# Patient Record
Sex: FEMALE | Race: BLACK OR AFRICAN AMERICAN | NOT HISPANIC OR LATINO | Employment: FULL TIME | URBAN - METROPOLITAN AREA
[De-identification: names, ages, dates, MRNs, and addresses within clinical notes are randomized per-mention and may not be internally consistent; named-entity substitution may affect disease eponyms.]

---

## 2018-09-18 ENCOUNTER — MEDICAL CORRESPONDENCE (OUTPATIENT)
Dept: HEALTH INFORMATION MANAGEMENT | Facility: CLINIC | Age: 28
End: 2018-09-18

## 2018-10-15 ENCOUNTER — RADIANT APPOINTMENT (OUTPATIENT)
Dept: MAMMOGRAPHY | Facility: CLINIC | Age: 28
End: 2018-10-15
Attending: ADVANCED PRACTICE MIDWIFE
Payer: COMMERCIAL

## 2018-10-15 DIAGNOSIS — N64.4 PAIN OF BOTH BREASTS: ICD-10-CM

## 2018-10-15 RX ORDER — LIDOCAINE HYDROCHLORIDE 10 MG/ML
10 INJECTION, SOLUTION EPIDURAL; INFILTRATION; INTRACAUDAL; PERINEURAL ONCE
Status: COMPLETED | OUTPATIENT
Start: 2018-10-15 | End: 2018-10-15

## 2018-10-15 RX ORDER — LIDOCAINE HYDROCHLORIDE AND EPINEPHRINE 10; 10 MG/ML; UG/ML
10 INJECTION, SOLUTION INFILTRATION; PERINEURAL ONCE
Status: COMPLETED | OUTPATIENT
Start: 2018-10-15 | End: 2018-10-15

## 2018-10-15 RX ADMIN — LIDOCAINE HYDROCHLORIDE 10 ML: 10 INJECTION, SOLUTION EPIDURAL; INFILTRATION; INTRACAUDAL; PERINEURAL at 14:36

## 2018-10-15 RX ADMIN — LIDOCAINE HYDROCHLORIDE AND EPINEPHRINE 10 ML: 10; 10 INJECTION, SOLUTION INFILTRATION; PERINEURAL at 14:36

## 2018-10-18 LAB — COPATH REPORT: NORMAL

## 2018-10-19 ENCOUNTER — TELEPHONE (OUTPATIENT)
Dept: MAMMOGRAPHY | Facility: CLINIC | Age: 28
End: 2018-10-19

## 2018-10-19 NOTE — TELEPHONE ENCOUNTER
I spoke with Leonel and reported her benign results from her recent breast biopsy.  Clinical follow up is recommended. She verbalized understanding and had no questions or concerns.

## 2019-12-10 ENCOUNTER — APPOINTMENT (OUTPATIENT)
Dept: GENERAL RADIOLOGY | Facility: CLINIC | Age: 29
End: 2019-12-10
Attending: EMERGENCY MEDICINE
Payer: COMMERCIAL

## 2019-12-10 ENCOUNTER — HOSPITAL ENCOUNTER (EMERGENCY)
Facility: CLINIC | Age: 29
Discharge: HOME | End: 2019-12-10
Attending: EMERGENCY MEDICINE | Admitting: EMERGENCY MEDICINE
Payer: COMMERCIAL

## 2019-12-10 VITALS
TEMPERATURE: 98.1 F | RESPIRATION RATE: 16 BRPM | BODY MASS INDEX: 33.75 KG/M2 | WEIGHT: 199.7 LBS | SYSTOLIC BLOOD PRESSURE: 118 MMHG | HEART RATE: 79 BPM | DIASTOLIC BLOOD PRESSURE: 70 MMHG | OXYGEN SATURATION: 95 %

## 2019-12-10 DIAGNOSIS — R07.9 CHEST PAIN, UNSPECIFIED TYPE: ICD-10-CM

## 2019-12-10 LAB
ALBUMIN SERPL-MCNC: 3.5 G/DL (ref 3.4–5)
ALP SERPL-CCNC: 85 U/L (ref 40–150)
ALT SERPL W P-5'-P-CCNC: 62 U/L (ref 0–50)
ANION GAP SERPL CALCULATED.3IONS-SCNC: 6 MMOL/L (ref 3–14)
AST SERPL W P-5'-P-CCNC: 20 U/L (ref 0–45)
BASOPHILS # BLD AUTO: 0 10E9/L (ref 0–0.2)
BASOPHILS NFR BLD AUTO: 0.3 %
BILIRUB SERPL-MCNC: 0.3 MG/DL (ref 0.2–1.3)
BUN SERPL-MCNC: 8 MG/DL (ref 7–30)
CALCIUM SERPL-MCNC: 8.7 MG/DL (ref 8.5–10.1)
CHLORIDE SERPL-SCNC: 104 MMOL/L (ref 94–109)
CO2 SERPL-SCNC: 29 MMOL/L (ref 20–32)
CREAT SERPL-MCNC: 0.77 MG/DL (ref 0.52–1.04)
DIFFERENTIAL METHOD BLD: ABNORMAL
EOSINOPHIL # BLD AUTO: 0.2 10E9/L (ref 0–0.7)
EOSINOPHIL NFR BLD AUTO: 1.9 %
ERYTHROCYTE [DISTWIDTH] IN BLOOD BY AUTOMATED COUNT: 14.2 % (ref 10–15)
GFR SERPL CREATININE-BSD FRML MDRD: >90 ML/MIN/{1.73_M2}
GLUCOSE SERPL-MCNC: 81 MG/DL (ref 70–99)
HCT VFR BLD AUTO: 40.2 % (ref 35–47)
HGB BLD-MCNC: 12.5 G/DL (ref 11.7–15.7)
IMM GRANULOCYTES # BLD: 0 10E9/L (ref 0–0.4)
IMM GRANULOCYTES NFR BLD: 0.3 %
LYMPHOCYTES # BLD AUTO: 3.4 10E9/L (ref 0.8–5.3)
LYMPHOCYTES NFR BLD AUTO: 28.3 %
MCH RBC QN AUTO: 27.2 PG (ref 26.5–33)
MCHC RBC AUTO-ENTMCNC: 31.1 G/DL (ref 31.5–36.5)
MCV RBC AUTO: 87 FL (ref 78–100)
MONOCYTES # BLD AUTO: 0.9 10E9/L (ref 0–1.3)
MONOCYTES NFR BLD AUTO: 7.6 %
NEUTROPHILS # BLD AUTO: 7.3 10E9/L (ref 1.6–8.3)
NEUTROPHILS NFR BLD AUTO: 61.6 %
NRBC # BLD AUTO: 0 10*3/UL
NRBC BLD AUTO-RTO: 0 /100
PLATELET # BLD AUTO: 398 10E9/L (ref 150–450)
POTASSIUM SERPL-SCNC: 3.5 MMOL/L (ref 3.4–5.3)
PROT SERPL-MCNC: 7.7 G/DL (ref 6.8–8.8)
RBC # BLD AUTO: 4.6 10E12/L (ref 3.8–5.2)
SODIUM SERPL-SCNC: 139 MMOL/L (ref 133–144)
TROPONIN I SERPL-MCNC: <0.015 UG/L (ref 0–0.04)
WBC # BLD AUTO: 11.9 10E9/L (ref 4–11)

## 2019-12-10 PROCEDURE — 99285 EMERGENCY DEPT VISIT HI MDM: CPT | Mod: 25 | Performed by: EMERGENCY MEDICINE

## 2019-12-10 PROCEDURE — 71046 X-RAY EXAM CHEST 2 VIEWS: CPT

## 2019-12-10 PROCEDURE — 84484 ASSAY OF TROPONIN QUANT: CPT | Performed by: EMERGENCY MEDICINE

## 2019-12-10 PROCEDURE — 80053 COMPREHEN METABOLIC PANEL: CPT | Performed by: EMERGENCY MEDICINE

## 2019-12-10 PROCEDURE — 93005 ELECTROCARDIOGRAM TRACING: CPT | Performed by: EMERGENCY MEDICINE

## 2019-12-10 PROCEDURE — 93010 ELECTROCARDIOGRAM REPORT: CPT | Mod: Z6 | Performed by: EMERGENCY MEDICINE

## 2019-12-10 PROCEDURE — 85025 COMPLETE CBC W/AUTO DIFF WBC: CPT | Performed by: EMERGENCY MEDICINE

## 2019-12-10 ASSESSMENT — ENCOUNTER SYMPTOMS
LIGHT-HEADEDNESS: 0
DIFFICULTY URINATING: 0
HEADACHES: 0
NECK STIFFNESS: 0
SHORTNESS OF BREATH: 0
CONFUSION: 0
ARTHRALGIAS: 0
ABDOMINAL PAIN: 0
EYE REDNESS: 0
COLOR CHANGE: 0
FEVER: 0

## 2019-12-10 NOTE — ED AVS SNAPSHOT
[facility], [place], Emergency Department  [address]  [facility] [address]  Phone:  [phone]  Fax:  [phone]                                    Leonel Mena   MRN: [id number]    Department:  [facility], Emergency Department   Date of Visit:  12/10/2019           After Visit Summary Signature Page    I have received my discharge instructions, and my questions have been answered. I have discussed any challenges I see with this plan with the nurse or doctor.    ..........................................................................................................................................  Patient/Patient Representative Signature      ..........................................................................................................................................  Patient Representative Print Name and Relationship to Patient    ..................................................               ................................................  Date                                   Time    ..........................................................................................................................................  Reviewed by Signature/Title    ...................................................              ..............................................  Date                                               Time          22EPIC Rev 08/18

## 2019-12-11 LAB — INTERPRETATION ECG - MUSE: NORMAL

## 2019-12-11 NOTE — ED PROVIDER NOTES
"    [facility] EMERGENCY DEPARTMENT ([facility])     December 10, 2019    History     Chief Complaint   Patient presents with     Chest Pain     has been having CP \" more on the L side x 2 weeks on and off, feels lilke being punch in the chest.\" Pt had an appointment today with PMD, spoke with PMD on the phone and pt was told to go to the ED bec \"her EKG was not working\"     HPI  Leonel Mena is a 29 year old female with no significant past medical history who presents to the ED for evaluation of intermittent left-sided chest pain that started 2 weeks ago. Patient reports she has one to two episodes of chest pain a day, which last for a couple minutes. She states it feels like someone is \"punching [her] chest.\" She notes lifting her arms above her head or adjusting her position makes the pain better. Patient reports she has never had chest pain like this before, and went to her PCP at 7:00 PM tonight, but was sent here because the \"EKG machine was not working.\" She states her doctor did draw blood and noticed she had high cholesterol, so she was prescribed a statin. Patient denies recent falls. She denies light-headedness or shortness of breath. She denies recent long car/airplane travel. Patient notes she receives the Depo shot, and is due for this at the end of the month. No other symptoms noted.     PAST MEDICAL HISTORY  Past Medical History:   Diagnosis Date     NO ACTIVE PROBLEMS      PAST SURGICAL HISTORY  History reviewed. No pertinent surgical history.  FAMILY HISTORY  Family History   Problem Relation Age of Onset     Family History Negative Other      SOCIAL HISTORY  Social History     Tobacco Use     Smoking status: Never Smoker     Smokeless tobacco: Never Used   Substance Use Topics     Alcohol use: Yes     Comment: .occ     MEDICATIONS  No current facility-administered medications for this encounter.      Current Outpatient Medications   Medication     cyclobenzaprine (FLEXERIL) 10 MG tablet "     levonorgestrel-ethinyl estradiol (SEASONALE) 0.15-0.03 MG per tablet     metroNIDAZOLE (FLAGYL) 500 MG tablet     ALLERGIES  No Known Allergies      I have reviewed the Medications, Allergies, Past Medical and Surgical History, and Social History in the Epic system.    Review of Systems   Constitutional: Negative for fever.   HENT: Negative for congestion.    Eyes: Negative for redness.   Respiratory: Negative for shortness of breath.    Cardiovascular: Positive for chest pain.   Gastrointestinal: Negative for abdominal pain.   Genitourinary: Negative for difficulty urinating.   Musculoskeletal: Negative for arthralgias and neck stiffness.   Skin: Negative for color change.   Neurological: Negative for light-headedness and headaches.   Psychiatric/Behavioral: Negative for confusion.   All other systems reviewed and are negative.      Physical Exam   BP: 126/75  Pulse: 77  Temp: 98.1  F (36.7  C)  Resp: 16  Weight: 90.6 kg (199 lb 11.2 oz)  SpO2: 98 %      Physical Exam  Constitutional:       General: She is not in acute distress.     Appearance: She is well-developed. She is not diaphoretic.   HENT:      Head: Normocephalic and atraumatic.      Mouth/Throat:      Pharynx: No oropharyngeal exudate.   Eyes:      General: No scleral icterus.        Right eye: No discharge.         Left eye: No discharge.      Pupils: Pupils are equal, round, and reactive to light.   Neck:      Musculoskeletal: Normal range of motion and neck supple.   Cardiovascular:      Rate and Rhythm: Normal rate and regular rhythm.      Heart sounds: Normal heart sounds. No murmur. No friction rub. No gallop.    Pulmonary:      Effort: Pulmonary effort is normal. No respiratory distress.      Breath sounds: Normal breath sounds. No wheezing.   Chest:      Chest wall: No tenderness.   Abdominal:      General: Bowel sounds are normal. There is no distension.      Palpations: Abdomen is soft.      Tenderness: There is no abdominal tenderness.    Musculoskeletal: Normal range of motion.         General: No tenderness or deformity.   Skin:     General: Skin is warm and dry.      Coloration: Skin is not pale.      Findings: No erythema or rash.   Neurological:      Mental Status: She is alert and oriented to person, place, and time.      Cranial Nerves: No cranial nerve deficit.         ED Course        Procedures             EKG Interpretation:      Interpreted by Manjit Salvador DO  Time reviewed: 1745  Symptoms at time of EKG: none   Rhythm: normal sinus   Rate: 72  Axis: normal  Ectopy: none  Conduction: normal  ST Segments/ T Waves: No ST-T wave changes  Q Waves: none  Comparison to prior: No old EKG available    Clinical Impression: normal EKG          Critical Care time:  none             Labs Ordered and Resulted from Time of ED Arrival Up to the Time of Departure from the ED - No data to display         Assessments & Plan (with Medical Decision Making)   Is a 29-year-old female who presents with left-sided chest pain.  This is been intermittent in nature for the past 2 weeks. Differential is broad and includes but is not limited to ACS, PE, pneumonia, pneumothorax, aortic dissection, musculoskeletal pain, esophageal etiology, or other abnormalities. No known precipitating factors.  Exam demonstrated no acute abnormalities.  ECG was normal.  Chest x-ray shows no acute abnormalities lab work shows a WBC count of 11.9, no other abnormalities.  I discussed all results with patient.  Discussed that we do not know the exact cause of patient's symptoms but it is unlikely related to serious cause such as ACS, PE, pneumonia, or other etiology. Will discharge home with return precautions. Discussed reasons to return to the emergency department.  Patient understands and agrees with this plan.     I have reviewed the nursing notes.    I have reviewed the findings, diagnosis, plan and need for follow up with the patient.    New Prescriptions    No medications  on file       Final diagnoses:   None     ILexi, am serving as a trained medical scribe to document services personally performed by Manjit Salvador DO, based on the provider's statements to me.      IManjit DO, was physically present and have reviewed and verified the accuracy of this note documented by Lexi Cao.     12/10/2019   [facility], [place], EMERGENCY DEPARTMENT     Manjit Salvador DO  12/11/19 0106

## 2020-03-09 ENCOUNTER — HOSPITAL ENCOUNTER (EMERGENCY)
Facility: CLINIC | Age: 30
Discharge: HOME | End: 2020-03-09
Attending: EMERGENCY MEDICINE | Admitting: EMERGENCY MEDICINE
Payer: COMMERCIAL

## 2020-03-09 VITALS
SYSTOLIC BLOOD PRESSURE: 116 MMHG | DIASTOLIC BLOOD PRESSURE: 77 MMHG | TEMPERATURE: 98.4 F | HEART RATE: 80 BPM | BODY MASS INDEX: 34.28 KG/M2 | RESPIRATION RATE: 16 BRPM | OXYGEN SATURATION: 100 % | HEIGHT: 64 IN

## 2020-03-09 DIAGNOSIS — R19.7 VOMITING AND DIARRHEA: ICD-10-CM

## 2020-03-09 DIAGNOSIS — R11.10 VOMITING AND DIARRHEA: ICD-10-CM

## 2020-03-09 PROBLEM — E78.5 HYPERLIPIDEMIA: Status: ACTIVE | Noted: 2020-02-01

## 2020-03-09 LAB
ALBUMIN SERPL-MCNC: 3.2 G/DL (ref 3.4–5)
ALP SERPL-CCNC: 78 U/L (ref 40–150)
ALT SERPL W P-5'-P-CCNC: 110 U/L (ref 0–50)
ANION GAP SERPL CALCULATED.3IONS-SCNC: 7 MMOL/L (ref 3–14)
AST SERPL W P-5'-P-CCNC: 41 U/L (ref 0–45)
BASOPHILS # BLD AUTO: 0 10E9/L (ref 0–0.2)
BASOPHILS NFR BLD AUTO: 0.2 %
BILIRUB SERPL-MCNC: 0.4 MG/DL (ref 0.2–1.3)
BUN SERPL-MCNC: 7 MG/DL (ref 7–30)
CALCIUM SERPL-MCNC: 8.5 MG/DL (ref 8.5–10.1)
CHLORIDE SERPL-SCNC: 112 MMOL/L (ref 94–109)
CO2 SERPL-SCNC: 23 MMOL/L (ref 20–32)
CREAT SERPL-MCNC: 0.92 MG/DL (ref 0.52–1.04)
DIFFERENTIAL METHOD BLD: NORMAL
EOSINOPHIL # BLD AUTO: 0.1 10E9/L (ref 0–0.7)
EOSINOPHIL NFR BLD AUTO: 1.4 %
ERYTHROCYTE [DISTWIDTH] IN BLOOD BY AUTOMATED COUNT: 13.8 % (ref 10–15)
GFR SERPL CREATININE-BSD FRML MDRD: 83 ML/MIN/{1.73_M2}
GLUCOSE SERPL-MCNC: 84 MG/DL (ref 70–99)
HCT VFR BLD AUTO: 38.6 % (ref 35–47)
HGB BLD-MCNC: 12.3 G/DL (ref 11.7–15.7)
IMM GRANULOCYTES # BLD: 0 10E9/L (ref 0–0.4)
IMM GRANULOCYTES NFR BLD: 0.2 %
LYMPHOCYTES # BLD AUTO: 2.6 10E9/L (ref 0.8–5.3)
LYMPHOCYTES NFR BLD AUTO: 31.1 %
MCH RBC QN AUTO: 27.2 PG (ref 26.5–33)
MCHC RBC AUTO-ENTMCNC: 31.9 G/DL (ref 31.5–36.5)
MCV RBC AUTO: 85 FL (ref 78–100)
MONOCYTES # BLD AUTO: 0.6 10E9/L (ref 0–1.3)
MONOCYTES NFR BLD AUTO: 6.6 %
NEUTROPHILS # BLD AUTO: 5.1 10E9/L (ref 1.6–8.3)
NEUTROPHILS NFR BLD AUTO: 60.5 %
NRBC # BLD AUTO: 0 10*3/UL
NRBC BLD AUTO-RTO: 0 /100
PLATELET # BLD AUTO: 315 10E9/L (ref 150–450)
POTASSIUM SERPL-SCNC: 3.8 MMOL/L (ref 3.4–5.3)
PROT SERPL-MCNC: 6.9 G/DL (ref 6.8–8.8)
RBC # BLD AUTO: 4.53 10E12/L (ref 3.8–5.2)
SODIUM SERPL-SCNC: 142 MMOL/L (ref 133–144)
WBC # BLD AUTO: 8.4 10E9/L (ref 4–11)

## 2020-03-09 PROCEDURE — 85025 COMPLETE CBC W/AUTO DIFF WBC: CPT | Performed by: EMERGENCY MEDICINE

## 2020-03-09 PROCEDURE — 80053 COMPREHEN METABOLIC PANEL: CPT | Performed by: EMERGENCY MEDICINE

## 2020-03-09 PROCEDURE — 25800030 ZZH RX IP 258 OP 636: Performed by: EMERGENCY MEDICINE

## 2020-03-09 PROCEDURE — 99284 EMERGENCY DEPT VISIT MOD MDM: CPT | Mod: Z6 | Performed by: EMERGENCY MEDICINE

## 2020-03-09 PROCEDURE — 99284 EMERGENCY DEPT VISIT MOD MDM: CPT | Mod: 25 | Performed by: EMERGENCY MEDICINE

## 2020-03-09 PROCEDURE — 96360 HYDRATION IV INFUSION INIT: CPT | Performed by: EMERGENCY MEDICINE

## 2020-03-09 RX ORDER — MEDROXYPROGESTERONE ACETATE 150 MG/ML
150 INJECTION, SUSPENSION INTRAMUSCULAR
COMMUNITY

## 2020-03-09 RX ORDER — ONDANSETRON 2 MG/ML
4 INJECTION INTRAMUSCULAR; INTRAVENOUS
Status: DISCONTINUED | OUTPATIENT
Start: 2020-03-09 | End: 2020-03-09 | Stop reason: HOSPADM

## 2020-03-09 RX ADMIN — SODIUM CHLORIDE 1000 ML: 9 INJECTION, SOLUTION INTRAVENOUS at 08:04

## 2020-03-09 ASSESSMENT — ENCOUNTER SYMPTOMS
VOMITING: 1
NAUSEA: 1
MYALGIAS: 1
DIARRHEA: 1
FEVER: 1
ABDOMINAL PAIN: 0

## 2020-03-09 NOTE — ED AVS SNAPSHOT
[facility], [place], Emergency Department  [address]  [facility] [address]  Phone:  [phone]  Fax:  [phone]                                    Leonel Mena   MRN: [id number]    Department:  [facility], Emergency Department   Date of Visit:  3/9/2020           After Visit Summary Signature Page    I have received my discharge instructions, and my questions have been answered. I have discussed any challenges I see with this plan with the nurse or doctor.    ..........................................................................................................................................  Patient/Patient Representative Signature      ..........................................................................................................................................  Patient Representative Print Name and Relationship to Patient    ..................................................               ................................................  Date                                   Time    ..........................................................................................................................................  Reviewed by Signature/Title    ...................................................              ..............................................  Date                                               Time          22EPIC Rev 08/18

## 2020-03-09 NOTE — DISCHARGE INSTRUCTIONS
Advance diet as tolerated    Please make an appointment to follow up with Your Primary Care Provider or our Primary Care Center (phone: [phone]) in 2-3 days if not improving.

## 2020-03-09 NOTE — ED PROVIDER NOTES
[facility] EMERGENCY DEPARTMENT ([facility])     March 9, 2020    History     Chief Complaint   Patient presents with     Nausea, Vomiting, & Diarrhea     since Thursday, body aches     HPI  Leonel Mena is a 30 year old female who states that on Thursday evening she went out to eat at a restaurant with another person and then on Friday had cavities filled at her dentist.  Patient states that Friday evening she started to feel ill with some mild body aches, fevers and this was associated with nausea and vomiting and diarrhea.  Patient states that that lasted approximately 24 hours and she started to feel better yesterday, but called her primary care who told her to come into the ER to be seen.  Patient denies any abdominal pain and states her vomiting and diarrhea has pretty much resolved, but she still has a few body aches left.    This part of the medical record was transcribed by Lexi Cao  Medical Scribe, from a dictation done by Meliton Wagner MD.     PAST MEDICAL HISTORY  Past Medical History:   Diagnosis Date     Hyperlipidemia 02/01/2020     NO ACTIVE PROBLEMS      PAST SURGICAL HISTORY  History reviewed. No pertinent surgical history.  FAMILY HISTORY  Family History   Problem Relation Age of Onset     Family History Negative Other      SOCIAL HISTORY  Social History     Tobacco Use     Smoking status: Never Smoker     Smokeless tobacco: Never Used   Substance Use Topics     Alcohol use: Yes     Comment: .occ     MEDICATIONS  Current Facility-Administered Medications   Medication     ondansetron (ZOFRAN) injection 4 mg     Current Outpatient Medications   Medication     medroxyPROGESTERone (DEPO-PROVERA) 150 MG/ML IM injection     ALLERGIES  Allergies   Allergen Reactions     Grass Hives       I have reviewed the Medications, Allergies, Past Medical and Surgical History, and Social History in the Epic system.    Review of Systems   Constitutional: Positive for fever.   Gastrointestinal:  "Positive for diarrhea (mostly resolved now), nausea and vomiting (mostly resolved now). Negative for abdominal pain.   Musculoskeletal: Positive for myalgias.   All other systems reviewed and are negative.      Physical Exam   BP: 119/80  Pulse: 80  Heart Rate: 80  Temp: 98.4  F (36.9  C)  Resp: 16  Height: 162.6 cm (5' 4\")  SpO2: 99 %      Physical Exam  Vitals signs and nursing note reviewed.   Constitutional:       Appearance: She is not ill-appearing.   HENT:      Head: Atraumatic.   Eyes:      Extraocular Movements: Extraocular movements intact.      Pupils: Pupils are equal, round, and reactive to light.   Neck:      Musculoskeletal: Neck supple.   Cardiovascular:      Rate and Rhythm: Regular rhythm.      Heart sounds: Normal heart sounds.   Pulmonary:      Breath sounds: Normal breath sounds.   Abdominal:      Palpations: Abdomen is soft.      Tenderness: There is no abdominal tenderness.   Musculoskeletal:         General: No deformity.   Skin:     General: Skin is warm.   Neurological:      General: No focal deficit present.      Mental Status: She is alert and oriented to person, place, and time.   Psychiatric:         Mood and Affect: Mood normal.         ED Course        Procedures        Results for orders placed or performed during the hospital encounter of 03/09/20 (from the past 24 hour(s))   CBC with platelets differential   Result Value Ref Range    WBC 8.4 4.0 - 11.0 10e9/L    RBC Count 4.53 3.8 - 5.2 10e12/L    Hemoglobin 12.3 11.7 - 15.7 g/dL    Hematocrit 38.6 35.0 - 47.0 %    MCV 85 78 - 100 fl    MCH 27.2 26.5 - 33.0 pg    MCHC 31.9 31.5 - 36.5 g/dL    RDW 13.8 10.0 - 15.0 %    Platelet Count 315 150 - 450 10e9/L    Diff Method Automated Method     % Neutrophils 60.5 %    % Lymphocytes 31.1 %    % Monocytes 6.6 %    % Eosinophils 1.4 %    % Basophils 0.2 %    % Immature Granulocytes 0.2 %    Nucleated RBCs 0 0 /100    Absolute Neutrophil 5.1 1.6 - 8.3 10e9/L    Absolute Lymphocytes 2.6 0.8 - " 5.3 10e9/L    Absolute Monocytes 0.6 0.0 - 1.3 10e9/L    Absolute Eosinophils 0.1 0.0 - 0.7 10e9/L    Absolute Basophils 0.0 0.0 - 0.2 10e9/L    Abs Immature Granulocytes 0.0 0 - 0.4 10e9/L    Absolute Nucleated RBC 0.0    Comprehensive metabolic panel   Result Value Ref Range    Sodium 142 133 - 144 mmol/L    Potassium 3.8 3.4 - 5.3 mmol/L    Chloride 112 (H) 94 - 109 mmol/L    Carbon Dioxide 23 20 - 32 mmol/L    Anion Gap 7 3 - 14 mmol/L    Glucose 84 70 - 99 mg/dL    Urea Nitrogen 7 7 - 30 mg/dL    Creatinine 0.92 0.52 - 1.04 mg/dL    GFR Estimate 83 >60 mL/min/[1.73_m2]    GFR Estimate If Black >90 >60 mL/min/[1.73_m2]    Calcium 8.5 8.5 - 10.1 mg/dL    Bilirubin Total 0.4 0.2 - 1.3 mg/dL    Albumin 3.2 (L) 3.4 - 5.0 g/dL    Protein Total 6.9 6.8 - 8.8 g/dL    Alkaline Phosphatase 78 40 - 150 U/L     (H) 0 - 50 U/L    AST 41 0 - 45 U/L         Labs Ordered and Resulted from Time of ED Arrival Up to the Time of Departure from the ED   COMPREHENSIVE METABOLIC PANEL - Abnormal; Notable for the following components:       Result Value    Chloride 112 (*)     Albumin 3.2 (*)      (*)     All other components within normal limits   CBC WITH PLATELETS DIFFERENTIAL   PERIPHERAL IV CATHETER            Assessments & Plan (with Medical Decision Making)     I have reviewed the nursing notes.    Medications   ondansetron (ZOFRAN) injection 4 mg (4 mg Intravenous Not Given 3/9/20 0805)   0.9% sodium chloride BOLUS (0 mLs Intravenous Stopped 3/9/20 0920)     Patient improved after IV fluids and meds.  At this time the patient is taking orally well and will be sent home.  Etiology of her vomiting and diarrhea is uncertain as to whether it was a virus or whether it was food poisoning.  Clinically the patient is much better at this point.    I have reviewed the findings, diagnosis, plan and need for follow up with the patient.    New Prescriptions    No medications on file       Final diagnoses:   Vomiting and  diarrhea     Advance diet as tolerated    Please make an appointment to follow up with Your Primary Care Provider or our Primary Care Center (phone: [phone]) in 2-3 days if not improving.    Meliton Wagner MD, MD    3/9/2020   [facility], [place], EMERGENCY DEPARTMENT     Meliton Wagner MD  03/09/20 0939

## 2020-03-17 ENCOUNTER — TELEPHONE (OUTPATIENT)
Dept: FAMILY MEDICINE | Facility: CLINIC | Age: 30
End: 2020-03-17

## 2020-09-02 ENCOUNTER — OFFICE VISIT - HEALTHEAST (OUTPATIENT)
Dept: OTOLARYNGOLOGY | Facility: CLINIC | Age: 30
End: 2020-09-02

## 2020-09-02 DIAGNOSIS — R22.1 SUBCUTANEOUS NODULE OF NECK: ICD-10-CM

## 2020-09-02 NOTE — TELEPHONE ENCOUNTER
RECORDS RECEIVED FROM: Internal/Care Everywhere   DATE RECEIVED: 9-23   NOTES STATUS DETAILS   OFFICE NOTE from referring provider    Care Everywhere 3-17-20 and 6-9-20 both list it Dr. Navarro   OFFICE NOTE from other cardiologist    N/A    DISCHARGE SUMMARY from hospital    N/A    DISCHARGE REPORT from the ER   Internal 12-10-19   OPERATIVE REPORT    N/A    MEDICATION LIST   Internal    LABS     BMP   N/A    CBC   Care Everywhere 6-9-20   CMP   Care Everywhere 6-9-20   Lipids   Care Everywhere 6-9-20   TSH   Care Everywhere 9-18-18   DIAGNOSTIC PROCEDURES     EKG   Internal 12-10-19   Monitor Reports   N/A    IMAGING (DISC & REPORT)      Echo   N/A    Stress Tests   N/A    Cath   N/A    MRI/MRA   N/A    CT/CTA   N/A

## 2020-09-23 ENCOUNTER — PRE VISIT (OUTPATIENT)
Dept: CARDIOLOGY | Facility: CLINIC | Age: 30
End: 2020-09-23

## 2020-09-23 ENCOUNTER — OFFICE VISIT (OUTPATIENT)
Dept: CARDIOLOGY | Facility: CLINIC | Age: 30
End: 2020-09-23
Attending: INTERNAL MEDICINE
Payer: COMMERCIAL

## 2020-09-23 VITALS
DIASTOLIC BLOOD PRESSURE: 71 MMHG | HEIGHT: 65 IN | BODY MASS INDEX: 34.49 KG/M2 | SYSTOLIC BLOOD PRESSURE: 104 MMHG | HEART RATE: 90 BPM | OXYGEN SATURATION: 97 % | WEIGHT: 207 LBS

## 2020-09-23 DIAGNOSIS — E78.00 HIGH BLOOD CHOLESTEROL: ICD-10-CM

## 2020-09-23 DIAGNOSIS — E78.00 HIGH BLOOD CHOLESTEROL: Primary | ICD-10-CM

## 2020-09-23 LAB
CHOLEST SERPL-MCNC: 385 MG/DL
CK SERPL-CCNC: 184 U/L (ref 30–225)
HDLC SERPL-MCNC: 43 MG/DL
LDLC SERPL CALC-MCNC: 322 MG/DL
NONHDLC SERPL-MCNC: 343 MG/DL
TRIGL SERPL-MCNC: 103 MG/DL

## 2020-09-23 PROCEDURE — 36415 COLL VENOUS BLD VENIPUNCTURE: CPT | Performed by: INTERNAL MEDICINE

## 2020-09-23 PROCEDURE — 82550 ASSAY OF CK (CPK): CPT | Performed by: INTERNAL MEDICINE

## 2020-09-23 PROCEDURE — G0463 HOSPITAL OUTPT CLINIC VISIT: HCPCS | Mod: ZF

## 2020-09-23 PROCEDURE — 99203 OFFICE O/P NEW LOW 30 MIN: CPT | Mod: ZP | Performed by: INTERNAL MEDICINE

## 2020-09-23 PROCEDURE — 80061 LIPID PANEL: CPT | Performed by: INTERNAL MEDICINE

## 2020-09-23 RX ORDER — ROSUVASTATIN CALCIUM 20 MG/1
20 TABLET, COATED ORAL DAILY
Qty: 90 TABLET | Refills: 3 | Status: SHIPPED | OUTPATIENT
Start: 2020-09-23

## 2020-09-23 ASSESSMENT — PAIN SCALES - GENERAL: PAINLEVEL: NO PAIN (0)

## 2020-09-23 ASSESSMENT — MIFFLIN-ST. JEOR: SCORE: 1641.89

## 2020-09-23 NOTE — PATIENT INSTRUCTIONS
Patient Instructions:  Labs today (lipid panel and CK total).  Start taking rosuvastatin.  In 3 months repeat lab (lipid panel) - must fast for 12 hours prior.   Please call to schedule that appointment : [phone]  As soon as results are compiled and reviewed, you will be notified.      It was a pleasure to see you in the cardiology clinic today.    We are encouraging the use of Optifyt to communicate with your Healthcare Provider.  If you have any questions, call  Wilver Melchor LPN, at [phone].  Press Option #1 for the [facility], and then press Option #4 for nursing.  Cardiology Fax  : [phone]      If you have an urgent need after hours (8:00 am to 4:30 pm) please call [phone] and ask for the cardiology fellow on call.

## 2020-09-23 NOTE — NURSING NOTE
Chief Complaint   Patient presents with     New Patient     new - referral from another health network     Vitals were taken and medications were reconciled.  Sigrid Ffoana  7:08 AM

## 2020-09-23 NOTE — LETTER
"9/23/2020      RE: Leonel Mena  [address] [id number]  [facility] [address]       Dear Colleague,    Thank you for the opportunity to participate in the care of your patient, Leonel Mena, at the [facility]. Please see a copy of my visit note below.    Reason for Visit:  Today I have visited with Leonel Mena for hyperlipidemia  Consult: Yes    HPI : Status / Symptoms / Concerns     30-year-old asymptomatic female with hyperlipidemia. Patient reports hx of high cholesterol for years. Recently tried on atorvastatin 20 mg but DC'd 2/2 muscle aches. Reports family hx of CVA and MI in grandmother on her mothers side. No smoking hx, no HTN, no DM hx. She has recently joined a gym and is exercising regularly.    Chest Pain:   No  Shortness of Breath:  No  Ankle Swelling:  No  Muscle Aches:  Yes, now improved  Palpitations:   No        Past Medical History:   Diagnosis Date     Hyperlipidemia 02/01/2020     NO ACTIVE PROBLEMS       No past surgical history on file.       Other Systems:  Resp - / GI - /MS - /Lacho - /Psy - /Derm - /Hem - / - /Lymph - /ENT -/ Endo -  No other pertinent concern in systems review.     Social History: reports that she has never smoked. She has never used smokeless tobacco. She reports current alcohol use. She reports that she does not use drugs.   I have reviewed this patient's social history and updated it with pertinent information if needed.    Family History:She indicated that the status of her other is unknown.     Family History   Problem Relation Age of Onset     Family History Negative Other        Medications:  Current Outpatient Medications   Medication     medroxyPROGESTERone (DEPO-PROVERA) 150 MG/ML IM injection     No current facility-administered medications for this visit.           Exam:  /71 (BP Location: Right arm, Patient Position: Chair, Cuff Size: Adult Regular)   Pulse 90   Ht 1.638 m (5' 4.5\") "   Wt 93.9 kg (207 lb)   SpO2 97%   BMI 34.98 kg/m     Body mass index is 34.98 kg/m .   General:  Alert, oriented, no acute distress, normal chair rise, walking not impaired   Eyes:  External exam normal, Conjunctivae noninjected and nonicteric.  Neck:  No JVD  Lungs:  Clear to auscultation bilaterally. No wheezes, crackles, rales or rhonchi,      no accessory muscle use   Heart:  Regular, normal S1 and S2, no obvious murmurs, no rubs or gallops  Lacho/Psy: Non-focal, normal mood, normal affect        Vital Trend:  Wt Readings from Last 3 Encounters:   12/10/19 90.6 kg (199 lb 11.2 oz)   12/16/15 83.7 kg (184 lb 8 oz)   01/15/15 76.9 kg (169 lb 8 oz)     BP Readings from Last 3 Encounters:   03/09/20 116/77   12/10/19 118/70   12/16/15 116/69     Pulse Readings from Last 3 Encounters:   03/09/20 80   12/10/19 79   12/16/15 93          Data:     Lab Review:  Lab Results   Component Value Date    CR 0.92 03/09/2020    CR 0.77 12/10/2019    POTASSIUM 3.8 03/09/2020    POTASSIUM 3.5 12/10/2019     03/09/2020     12/10/2019     CHOLESTEROL, TOTAL  <200 mg/dL  298High       HDL CHOLESTEROL  > OR = 50 mg/dL  38Low       TRIGLYCERIDES  <150 mg/dL  93     LDL-CHOLESTEROL  99 mg/dL (calc)  239High            Assessment:     Leonel Mena is a 30 year old female with asymptomatic hyperlipidemia. Overall she has risk factors including obesity and family hx. Discussed the primary goal will be lowering of LDL and total cholesterol. Plan will be for her to continue exercise and diet regimen which she has already begun. Will start Crestor 20 mg daily.       Plan:   1. Crestor 20 mg daily, will plan on phone call in 3 months and repeat lipid panel at that time.  2. Counseled on muscle aches, hydration and monitoring of symptoms while on crestor   3. Agree with establishing care with PCP and continuing exercise plan      Contingency Plan: Reducing or skiping doses, considering other lipid lowering agents if she has  difficulty with symptoms     Follow-up: Will call patient in 3 months following lipids    This note was software transcribed.       Please do not hesitate to contact me if you have any questions/concerns.     Sincerely,     Chan Noel MD

## 2020-09-23 NOTE — PROGRESS NOTES
"Reason for Visit:  Today I have visited with Leonel Trina for hyperlipidemia  Consult: Yes    HPI : Status / Symptoms / Concerns     30-year-old asymptomatic female with hyperlipidemia. Patient reports hx of high cholesterol for years. Recently tried on atorvastatin 20 mg but DC'd 2/2 muscle aches. Reports family hx of CVA and MI in grandmother on her mothers side. No smoking hx, no HTN, no DM hx. She has recently joined a gym and is exercising regularly.    Chest Pain:   No  Shortness of Breath:  No  Ankle Swelling:  No  Muscle Aches:  Yes, now improved  Palpitations:   No        Past Medical History:   Diagnosis Date     Hyperlipidemia 02/01/2020     NO ACTIVE PROBLEMS       No past surgical history on file.       Other Systems:  Resp - / GI - /MS - /Lacho - /Psy - /Derm - /Hem - / - /Lymph - /ENT -/ Endo -  No other pertinent concern in systems review.     Social History: reports that she has never smoked. She has never used smokeless tobacco. She reports current alcohol use. She reports that she does not use drugs.   I have reviewed this patient's social history and updated it with pertinent information if needed.    Family History:She indicated that the status of her other is unknown.     Family History   Problem Relation Age of Onset     Family History Negative Other        Medications:  Current Outpatient Medications   Medication     medroxyPROGESTERone (DEPO-PROVERA) 150 MG/ML IM injection     No current facility-administered medications for this visit.           Exam:  /71 (BP Location: Right arm, Patient Position: Chair, Cuff Size: Adult Regular)   Pulse 90   Ht 1.638 m (5' 4.5\")   Wt 93.9 kg (207 lb)   SpO2 97%   BMI 34.98 kg/m     Body mass index is 34.98 kg/m .   General:  Alert, oriented, no acute distress, normal chair rise, walking not impaired   Eyes:  External exam normal, Conjunctivae noninjected and nonicteric.  Neck:  No JVD  Lungs:  Clear to auscultation bilaterally. No wheezes, " crackles, rales or rhonchi,      no accessory muscle use   Heart:  Regular, normal S1 and S2, no obvious murmurs, no rubs or gallops  Lacho/Psy: Non-focal, normal mood, normal affect        Vital Trend:  Wt Readings from Last 3 Encounters:   12/10/19 90.6 kg (199 lb 11.2 oz)   12/16/15 83.7 kg (184 lb 8 oz)   01/15/15 76.9 kg (169 lb 8 oz)     BP Readings from Last 3 Encounters:   03/09/20 116/77   12/10/19 118/70   12/16/15 116/69     Pulse Readings from Last 3 Encounters:   03/09/20 80   12/10/19 79   12/16/15 93          Data:     Lab Review:  Lab Results   Component Value Date    CR 0.92 03/09/2020    CR 0.77 12/10/2019    POTASSIUM 3.8 03/09/2020    POTASSIUM 3.5 12/10/2019     03/09/2020     12/10/2019     CHOLESTEROL, TOTAL  <200 mg/dL  298High       HDL CHOLESTEROL  > OR = 50 mg/dL  38Low       TRIGLYCERIDES  <150 mg/dL  93     LDL-CHOLESTEROL  99 mg/dL (calc)  239High            Assessment:     Leonel Mena is a 30 year old female with asymptomatic hyperlipidemia. Overall she has risk factors including obesity and family hx. Discussed the primary goal will be lowering of LDL and total cholesterol. Plan will be for her to continue exercise and diet regimen which she has already begun. Will start Crestor 20 mg daily.       Plan:   1. Crestor 20 mg daily, will plan on phone call in 3 months and repeat lipid panel at that time.  2. Counseled on muscle aches, hydration and monitoring of symptoms while on crestor   3. Agree with establishing care with PCP and continuing exercise plan      Contingency Plan: Reducing or skiping doses, considering other lipid lowering agents if she has difficulty with symptoms     Follow-up: Will call patient in 3 months following lipids    This note was software transcribed.

## 2020-10-02 ENCOUNTER — HOSPITAL ENCOUNTER (OUTPATIENT)
Dept: ULTRASOUND IMAGING | Facility: HOSPITAL | Age: 30
Discharge: HOME | End: 2020-10-02
Attending: OTOLARYNGOLOGY

## 2020-10-02 DIAGNOSIS — R22.1 SUBCUTANEOUS NODULE OF NECK: ICD-10-CM

## 2020-10-14 ENCOUNTER — COMMUNICATION - HEALTHEAST (OUTPATIENT)
Dept: OTOLARYNGOLOGY | Facility: CLINIC | Age: 30
End: 2020-10-14

## 2021-06-11 NOTE — PROGRESS NOTES
HISTORY OF PRESENT ILLNESS  Patient comes in for evaluation of swallowing concerns. Patient reports that when she swallows she feels a lump underneath her jaw. She notices a lump in her upper neck/submentum that lowers when she swallows. She had not noticed this before. No problems swallowing. She handles all types of food and liquid normally. No change in voice. No pain or discomfort. She notices the lump with normal swallowing and dry swallowing.     REVIEW OF SYSTEMS  Review of Systems: a 10-system review was performed. Pertinent positives are noted in the HPI and on a separate scanned document in the chart.    PMH, PSH, FH and SH has documented in the EHR.      EXAM    CONSTITUTIONAL  General Appearance:  Normal, well developed, well nourished, no obvious distress  Ability to Communicate:  communicates appropriately.    HEAD AND FACE  Appearance and Symmetry:  Normal, no scalp or facial scarring or suspicious lesions.  Paranasal sinuses tenderness:  Normal, Paranasal sinuses non tender    EARS  Clinical speech reception threshold:  Normal, able to hear normal speech.  Auricle:  Normal, Auricles without scars, lesions, masses.  External auditory canal:  Normal, External auditory canal normal.  Tympanic membrane:  Normal, Tympanic membranes normal without swelling or erythema.    NOSE (speculum or scope)  Architecture:  Normal, Grossly normal external nasal architecture with no masses or lesions.  Mucosa:  Normal mucosa, No polyps or masses.  Septum:  Normal, Septum non-obstructing.  Turbinates:  Normal, No turbinate abnormalities    ORAL CAVITY AND OROPHARYNX  Lips:  Normal.  Dental and gingiva:  Normal, No obvious dental or gingival disease.  Mucosa:  Normal, Moist mucous membranes.  Tongue:  Normal, Tongue mobile with no mucosal abnormalities  Hard and soft palate:  Normal, Hard and soft palate without cleft or mucosal lesions.  Oral pharynx:  Normal, Posterior pharynx without lesions or remarkable  asymmetry.  Saliva:  Normal, Clear saliva.  Masses:  Normal, No palpable masses or pathologically enlarged lymph nodes.    NECK  Palpable 1cm  nodule in the submentum   Masses/lymph nodes:  Normal, No worrisome neck masses or lymph nodes.  Salivary glands:  Normal, Parotid and submandibular glands.  Trachea and larynx position:  Normal, Trachea and larynx midline.  Thyroid:  Normal, No thyroid abnormality.  Tenderness:  Normal, No cervical tenderness.  Suppleness:  Normal, Neck supple    NEUROLOGICAL  Speech pattern:  Normal, Proasaic    RESPIRATORY  Symmetry and Respiratory effort:  Normal, Symmetric chest movement and expansion with no increased intercostal retractions or use of accessory muscles.     IMPRESSION  Palpable nodule in the submentum    RECOMMENDATION  Ultrasound of the area to determine if lymph node vs other lesion.    Andrew Mensah MD

## 2021-06-12 NOTE — TELEPHONE ENCOUNTER
----- Message from Andrew VELA MD sent at 10/12/2020  4:28 PM CDT -----  You can let the patient know that the ultrasound showed a normal lymph node. Nothing further needs to be done at this time. The radiologist was clear that the size, shape and appearance are consistent with a normal lymph node. Follow up if questions.  ----- Message -----  From: Interface, Rad Results In  Sent: 10/2/2020   7:14 AM CDT  To: Andrew VELA MD

## 2021-06-12 NOTE — TELEPHONE ENCOUNTER
Left message for patient to call back.    Clarissa Ernst RN  [facility] [place]  [phone]

## 2021-06-12 NOTE — TELEPHONE ENCOUNTER
Patient notified of normal US results. Told her to follow up if she has an new symptoms or issues. She expressed understanding.    Clarisas Ernst RN  [facility]  [phone]

## 2021-07-30 ENCOUNTER — HOSPITAL ENCOUNTER (EMERGENCY)
Facility: CLINIC | Age: 31
End: 2021-07-30
Payer: COMMERCIAL

## 2022-04-29 ENCOUNTER — APPOINTMENT (OUTPATIENT)
Dept: GENERAL RADIOLOGY | Facility: CLINIC | Age: 32
End: 2022-04-29
Attending: EMERGENCY MEDICINE
Payer: COMMERCIAL

## 2022-04-29 ENCOUNTER — HOSPITAL ENCOUNTER (EMERGENCY)
Facility: CLINIC | Age: 32
Discharge: HOME | End: 2022-04-29
Attending: EMERGENCY MEDICINE | Admitting: EMERGENCY MEDICINE
Payer: COMMERCIAL

## 2022-04-29 VITALS
DIASTOLIC BLOOD PRESSURE: 69 MMHG | OXYGEN SATURATION: 100 % | HEART RATE: 83 BPM | TEMPERATURE: 98.7 F | WEIGHT: 218.7 LBS | BODY MASS INDEX: 36.96 KG/M2 | RESPIRATION RATE: 16 BRPM | SYSTOLIC BLOOD PRESSURE: 109 MMHG

## 2022-04-29 DIAGNOSIS — Z20.822 COVID-19 RULED OUT BY LABORATORY TESTING: ICD-10-CM

## 2022-04-29 DIAGNOSIS — R07.9 CHEST PAIN, UNSPECIFIED TYPE: ICD-10-CM

## 2022-04-29 LAB
ALBUMIN SERPL-MCNC: 3.4 G/DL (ref 3.4–5)
ALBUMIN UR-MCNC: 10 MG/DL
ALP SERPL-CCNC: 86 U/L (ref 40–150)
ALT SERPL W P-5'-P-CCNC: 23 U/L (ref 0–50)
ANION GAP SERPL CALCULATED.3IONS-SCNC: 4 MMOL/L (ref 3–14)
APPEARANCE UR: ABNORMAL
AST SERPL W P-5'-P-CCNC: 15 U/L (ref 0–45)
BACTERIA #/AREA URNS HPF: ABNORMAL /HPF
BASOPHILS # BLD AUTO: 0 10E3/UL (ref 0–0.2)
BASOPHILS NFR BLD AUTO: 0 %
BILIRUB SERPL-MCNC: 0.3 MG/DL (ref 0.2–1.3)
BILIRUB UR QL STRIP: NEGATIVE
BUN SERPL-MCNC: 8 MG/DL (ref 7–30)
CALCIUM SERPL-MCNC: 9.1 MG/DL (ref 8.5–10.1)
CHLORIDE BLD-SCNC: 105 MMOL/L (ref 94–109)
CO2 SERPL-SCNC: 28 MMOL/L (ref 20–32)
COLOR UR AUTO: ABNORMAL
CREAT SERPL-MCNC: 0.86 MG/DL (ref 0.52–1.04)
D DIMER PPP FEU-MCNC: 0.46 UG/ML FEU (ref 0–0.5)
EOSINOPHIL # BLD AUTO: 0.2 10E3/UL (ref 0–0.7)
EOSINOPHIL NFR BLD AUTO: 1 %
ERYTHROCYTE [DISTWIDTH] IN BLOOD BY AUTOMATED COUNT: 14.1 % (ref 10–15)
FLUAV RNA SPEC QL NAA+PROBE: NEGATIVE
FLUBV RNA RESP QL NAA+PROBE: NEGATIVE
GFR SERPL CREATININE-BSD FRML MDRD: 90 ML/MIN/1.73M2
GLUCOSE BLD-MCNC: 80 MG/DL (ref 70–99)
GLUCOSE UR STRIP-MCNC: NEGATIVE MG/DL
HCG UR QL: NEGATIVE
HCT VFR BLD AUTO: 36.9 % (ref 35–47)
HGB BLD-MCNC: 11.9 G/DL (ref 11.7–15.7)
HGB UR QL STRIP: ABNORMAL
HOLD SPECIMEN: NORMAL
HOLD SPECIMEN: NORMAL
IMM GRANULOCYTES # BLD: 0 10E3/UL
IMM GRANULOCYTES NFR BLD: 0 %
INTERNAL QC OK POCT: NORMAL
KETONES UR STRIP-MCNC: NEGATIVE MG/DL
LACTATE SERPL-SCNC: 0.7 MMOL/L (ref 0.7–2)
LEUKOCYTE ESTERASE UR QL STRIP: NEGATIVE
LYMPHOCYTES # BLD AUTO: 3.5 10E3/UL (ref 0.8–5.3)
LYMPHOCYTES NFR BLD AUTO: 29 %
MCH RBC QN AUTO: 27 PG (ref 26.5–33)
MCHC RBC AUTO-ENTMCNC: 32.2 G/DL (ref 31.5–36.5)
MCV RBC AUTO: 84 FL (ref 78–100)
MONOCYTES # BLD AUTO: 0.9 10E3/UL (ref 0–1.3)
MONOCYTES NFR BLD AUTO: 8 %
MUCOUS THREADS #/AREA URNS LPF: PRESENT /LPF
NEUTROPHILS # BLD AUTO: 7.4 10E3/UL (ref 1.6–8.3)
NEUTROPHILS NFR BLD AUTO: 62 %
NITRATE UR QL: POSITIVE
NRBC # BLD AUTO: 0 10E3/UL
NRBC BLD AUTO-RTO: 0 /100
PH UR STRIP: 7 [PH] (ref 5–7)
PLATELET # BLD AUTO: 412 10E3/UL (ref 150–450)
POCT KIT EXPIRATION DATE: NORMAL
POCT KIT LOT NUMBER: NORMAL
POTASSIUM BLD-SCNC: 3.9 MMOL/L (ref 3.4–5.3)
PROT SERPL-MCNC: 7.6 G/DL (ref 6.8–8.8)
RBC # BLD AUTO: 4.4 10E6/UL (ref 3.8–5.2)
RBC URINE: 7 /HPF
SARS-COV-2 RNA RESP QL NAA+PROBE: NEGATIVE
SODIUM SERPL-SCNC: 137 MMOL/L (ref 133–144)
SP GR UR STRIP: 1.02 (ref 1–1.03)
SQUAMOUS EPITHELIAL: 13 /HPF
TRANSITIONAL EPI: <1 /HPF
TROPONIN I SERPL HS-MCNC: <3 NG/L
UROBILINOGEN UR STRIP-MCNC: NORMAL MG/DL
WBC # BLD AUTO: 12.1 10E3/UL (ref 4–11)
WBC URINE: 2 /HPF

## 2022-04-29 PROCEDURE — 99285 EMERGENCY DEPT VISIT HI MDM: CPT | Mod: 25 | Performed by: EMERGENCY MEDICINE

## 2022-04-29 PROCEDURE — 96374 THER/PROPH/DIAG INJ IV PUSH: CPT | Performed by: EMERGENCY MEDICINE

## 2022-04-29 PROCEDURE — 93005 ELECTROCARDIOGRAM TRACING: CPT | Performed by: EMERGENCY MEDICINE

## 2022-04-29 PROCEDURE — 80053 COMPREHEN METABOLIC PANEL: CPT | Performed by: EMERGENCY MEDICINE

## 2022-04-29 PROCEDURE — 87186 SC STD MICRODIL/AGAR DIL: CPT | Performed by: EMERGENCY MEDICINE

## 2022-04-29 PROCEDURE — 71046 X-RAY EXAM CHEST 2 VIEWS: CPT

## 2022-04-29 PROCEDURE — 85379 FIBRIN DEGRADATION QUANT: CPT | Performed by: EMERGENCY MEDICINE

## 2022-04-29 PROCEDURE — 81001 URINALYSIS AUTO W/SCOPE: CPT | Performed by: EMERGENCY MEDICINE

## 2022-04-29 PROCEDURE — 81025 URINE PREGNANCY TEST: CPT | Performed by: EMERGENCY MEDICINE

## 2022-04-29 PROCEDURE — 87636 SARSCOV2 & INF A&B AMP PRB: CPT | Performed by: EMERGENCY MEDICINE

## 2022-04-29 PROCEDURE — 250N000013 HC RX MED GY IP 250 OP 250 PS 637: Performed by: EMERGENCY MEDICINE

## 2022-04-29 PROCEDURE — C9803 HOPD COVID-19 SPEC COLLECT: HCPCS | Performed by: EMERGENCY MEDICINE

## 2022-04-29 PROCEDURE — 84484 ASSAY OF TROPONIN QUANT: CPT | Performed by: EMERGENCY MEDICINE

## 2022-04-29 PROCEDURE — 87636 SARSCOV2 & INF A&B AMP PRB: CPT | Mod: 59 | Performed by: EMERGENCY MEDICINE

## 2022-04-29 PROCEDURE — 83605 ASSAY OF LACTIC ACID: CPT | Performed by: EMERGENCY MEDICINE

## 2022-04-29 PROCEDURE — 93308 TTE F-UP OR LMTD: CPT | Mod: 26 | Performed by: EMERGENCY MEDICINE

## 2022-04-29 PROCEDURE — 93308 TTE F-UP OR LMTD: CPT | Performed by: EMERGENCY MEDICINE

## 2022-04-29 PROCEDURE — 93010 ELECTROCARDIOGRAM REPORT: CPT | Mod: 59 | Performed by: EMERGENCY MEDICINE

## 2022-04-29 PROCEDURE — 85025 COMPLETE CBC W/AUTO DIFF WBC: CPT | Performed by: EMERGENCY MEDICINE

## 2022-04-29 PROCEDURE — 36415 COLL VENOUS BLD VENIPUNCTURE: CPT | Performed by: EMERGENCY MEDICINE

## 2022-04-29 PROCEDURE — 250N000011 HC RX IP 250 OP 636: Performed by: EMERGENCY MEDICINE

## 2022-04-29 RX ORDER — ACETAMINOPHEN 325 MG/1
975 TABLET ORAL ONCE
Status: COMPLETED | OUTPATIENT
Start: 2022-04-29 | End: 2022-04-29

## 2022-04-29 RX ORDER — MORPHINE SULFATE 4 MG/ML
4 INJECTION, SOLUTION INTRAMUSCULAR; INTRAVENOUS ONCE
Status: COMPLETED | OUTPATIENT
Start: 2022-04-29 | End: 2022-04-29

## 2022-04-29 RX ORDER — ASPIRIN 325 MG
325 TABLET ORAL ONCE
Status: COMPLETED | OUTPATIENT
Start: 2022-04-29 | End: 2022-04-29

## 2022-04-29 RX ADMIN — ASPIRIN 325 MG ORAL TABLET 325 MG: 325 PILL ORAL at 20:04

## 2022-04-29 RX ADMIN — ACETAMINOPHEN 975 MG: 325 TABLET ORAL at 20:05

## 2022-04-29 RX ADMIN — MORPHINE SULFATE 4 MG: 4 INJECTION INTRAVENOUS at 20:05

## 2022-04-29 NOTE — ED PROVIDER NOTES
"ED Provider Note  [facility]      History     Chief Complaint   Patient presents with     Chest Pain     Onset last night about 2200 in left chest area, pain and \"feels like there is air in my chest\"; nothing relieves the pain; very tender left upper chest wall     Shortness of Breath     Onset today; denies hx of asthma; does not appear in distress, O2 sats 97%     HPI  Leonel Mena is a 32 year old female with a history of hyperlipidemia no longer on rosuvastain who presents for evaluation of Chest Pain (Onset last night about 2200 in left chest area, pain and \"feels like there is air in my chest\"; nothing relieves the pain; very tender left upper chest wall) and Shortness of Breath (Onset today; denies hx of asthma; does not appear in distress, O2 sats 97%)    Presents for evaluation of constant left-sided chest pain that started last evening between 10 and 11 PM.  She was at rest at the time of onset.  The pain does not radiate.  She rates as 8 out of 10 in severity. Describes as the sensation that she has air in her chest or a heaviness. Feels like she wants to pop it. Pain is somewhat improved with positional changes. Feels like she is suffocating at times while lying flat. Not associated with exertion. No trauma or recent strenuous activity. No fevers, chills, cough, nausea, vomiting, abdominal pain, diaphoresis, leg swelling or pain, prolonged  immobilization/travel.  No personal or family history of VTE.  No family history of early onset coronary disease.    History of hyperlipidemia not currently on statin therapy.  Discontinued Depo shots for contraception approximately 1 to 2 months ago.    Past Medical History  Past Medical History:   Diagnosis Date     Hyperlipidemia 02/01/2020     NO ACTIVE PROBLEMS      History reviewed. No pertinent surgical history.  medroxyPROGESTERone (DEPO-PROVERA) 150 MG/ML IM injection  rosuvastatin (CRESTOR) 20 MG tablet      Allergies "   Allergen Reactions     Grass Hives     Family History  Family History   Problem Relation Age of Onset     Family History Negative Other      Social History   Social History     Tobacco Use     Smoking status: Never Smoker     Smokeless tobacco: Never Used   Substance Use Topics     Alcohol use: Yes     Comment: .occ     Drug use: No      Past medical history, past surgical history, medications, allergies, family history, and social history were reviewed with the patient. No additional pertinent items.       Review of Systems  A complete review of systems was performed with pertinent positives and negatives noted in the HPI, and all other systems negative.    Physical Exam   BP: 120/78  Pulse: 82  Temp: 98.7  F (37.1  C)  Resp: 16  Weight: 99.2 kg (218 lb 11.2 oz)  SpO2: 99 %  Physical Exam  Vitals and nursing note reviewed.   Constitutional:       General: She is not in acute distress.  HENT:      Head: Normocephalic and atraumatic.      Right Ear: External ear normal.      Left Ear: External ear normal.   Eyes:      Conjunctiva/sclera: Conjunctivae normal.   Cardiovascular:      Rate and Rhythm: Normal rate and regular rhythm.      Heart sounds: Normal heart sounds.   Pulmonary:      Effort: Pulmonary effort is normal.      Breath sounds: Normal breath sounds.   Chest:      Chest wall: No tenderness, crepitus or edema.   Abdominal:      Palpations: Abdomen is soft.      Tenderness: There is no abdominal tenderness.   Musculoskeletal:         General: No deformity.      Right lower leg: No edema.      Left lower leg: No edema.   Skin:     General: Skin is warm and dry.      Capillary Refill: Capillary refill takes less than 2 seconds.   Neurological:      General: No focal deficit present.      Mental Status: She is alert.   Psychiatric:         Mood and Affect: Mood normal.         Diagnostics/Procedures   Procedures  Results for orders placed during the hospital encounter of 04/29/22    POC US ECHO  LIMITED    Impression  [facility] Procedure Note    Limited Bedside ED Cardiac Ultrasound:    PROCEDURE: PERFORMED BY: Dr. Alexandra Tan MD  INDICATIONS/SYMPTOM:  Chest Pain  PROBE: Cardiac phased array probe  BODY LOCATION: Chest  FINDINGS:  The ultrasound was performed utilizing the subcostal, parasternal long axis, parasternal short axis and apical 4 chamber views. Bilateral lungs  Cardiac contractility:  Present  Gross estimation of cardiac kinesis: normal  Pericardial Effusion:  None  RV:LV ratio: LV > RV  IVC: IVC diameter : mid-range. Collapsibility:  IVC collapses > 50% with inspiration  Lungs: A-line predominance. Sliding sign present bilaterally. No pleural effusion.  INTERPRETATION:    Chamber size and motion were grossly normal with LV > RV, normal cardiac kinesis.  No pericardial effusion was found.  IVC visualized and findings indicate normovolemia. Lung ultrasound consistent with normal aeration.  IMAGE DOCUMENTATION: Images were archived to PACs system.            Results for orders placed or performed during the hospital encounter of 04/29/22   Chest XR,  PA & LAT     Status: None    Narrative    EXAM: XR CHEST 2 VW  LOCATION: [facility]  DATE/TIME: 4/29/2022 8:16 PM    INDICATION: left sided chest pain  COMPARISON: 12/10/2019.      Impression    IMPRESSION: Negative chest.   POC US ECHO LIMITED     Status: None    Impression    [facility] Procedure Note      Limited Bedside ED Cardiac Ultrasound:    PROCEDURE: PERFORMED BY: Dr. Alexandra Tan MD  INDICATIONS/SYMPTOM:  Chest Pain  PROBE: Cardiac phased array probe  BODY LOCATION: Chest  FINDINGS:   The ultrasound was performed utilizing the subcostal, parasternal long axis, parasternal short axis and apical 4 chamber views. Bilateral lungs  Cardiac contractility:  Present  Gross estimation of cardiac kinesis: normal  Pericardial Effusion:  None  RV:LV ratio: LV > RV  IVC: IVC diameter :  mid-range. Collapsibility:  IVC collapses > 50% with inspiration  Lungs: A-line predominance. Sliding sign present bilaterally. No pleural effusion.   INTERPRETATION:    Chamber size and motion were grossly normal with LV > RV, normal cardiac kinesis.  No pericardial effusion was found.  IVC visualized and findings indicate normovolemia. Lung ultrasound consistent with normal aeration.  IMAGE DOCUMENTATION: Images were archived to PACs system.         D dimer quantitative     Status: Normal   Result Value Ref Range    D-Dimer Quantitative 0.46 0.00 - 0.50 ug/mL FEU    Narrative    This D-dimer assay is intended for use in conjunction with a clinical pretest probability assessment model to exclude pulmonary embolism (PE) and deep venous thrombosis (DVT) in outpatients suspected of PE or DVT. The cut-off value is 0.50 ug/mL FEU.   Comprehensive metabolic panel     Status: Normal   Result Value Ref Range    Sodium 137 133 - 144 mmol/L    Potassium 3.9 3.4 - 5.3 mmol/L    Chloride 105 94 - 109 mmol/L    Carbon Dioxide (CO2) 28 20 - 32 mmol/L    Anion Gap 4 3 - 14 mmol/L    Urea Nitrogen 8 7 - 30 mg/dL    Creatinine 0.86 0.52 - 1.04 mg/dL    Calcium 9.1 8.5 - 10.1 mg/dL    Glucose 80 70 - 99 mg/dL    Alkaline Phosphatase 86 40 - 150 U/L    AST 15 0 - 45 U/L    ALT 23 0 - 50 U/L    Protein Total 7.6 6.8 - 8.8 g/dL    Albumin 3.4 3.4 - 5.0 g/dL    Bilirubin Total 0.3 0.2 - 1.3 mg/dL    GFR Estimate 90 >60 mL/min/1.73m2   Lactic acid whole blood     Status: Normal   Result Value Ref Range    Lactic Acid 0.7 0.7 - 2.0 mmol/L   Troponin I     Status: Normal   Result Value Ref Range    Troponin I High Sensitivity <3 <54 ng/L   Symptomatic; Unknown Influenza A/B & SARS-CoV2 (COVID-19) Virus PCR Multiplex Nasopharyngeal     Status: Normal    Specimen: Nasopharyngeal; Swab   Result Value Ref Range    Influenza A PCR Negative Negative    Influenza B PCR Negative Negative    SARS CoV2 PCR Negative Negative    Narrative    Testing  was performed using the arslan SARS-CoV-2 & Influenza A/B Assay on the arslan Nelly System. This test should be ordered for the detection of SARS-CoV-2 and influenza viruses in individuals who meet clinical and/or epidemiological criteria. Test performance is unknown in asymptomatic patients. This test is for in vitro diagnostic use under the FDA EUA for laboratories certified under CLIA to perform moderate and/or high complexity testing. This test has not been FDA cleared or approved. A negative result does not rule out the presence of PCR inhibitors in the specimen or target RNA in concentration below the limit of detection for the assay. If only one viral target is positive but coinfection with multiple targets is suspected, the sample should be re-tested with another FDA cleared, approved or authorized test, if coinfection would change clinical management. [facility] are certified under the Clinical Laboratory Improvement Amendments of 1988 (CLIA-88) as  qualified to perform moderate and/or high complexity laboratory testing.   UA with Microscopic reflex to Culture     Status: Abnormal    Specimen: Urine, NOS   Result Value Ref Range    Color Urine Light Yellow Colorless, Straw, Light Yellow, Yellow    Appearance Urine Slightly Cloudy (A) Clear    Glucose Urine Negative Negative mg/dL    Bilirubin Urine Negative Negative    Ketones Urine Negative Negative mg/dL    Specific Gravity Urine 1.024 1.003 - 1.035    Blood Urine Small (A) Negative    pH Urine 7.0 5.0 - 7.0    Protein Albumin Urine 10  (A) Negative mg/dL    Urobilinogen Urine Normal Normal, 2.0 mg/dL    Nitrite Urine Positive (A) Negative    Leukocyte Esterase Urine Negative Negative    Bacteria Urine Many (A) None Seen /HPF    Mucus Urine Present (A) None Seen /LPF    RBC Urine 7 (H) <=2 /HPF    WBC Urine 2 <=5 /HPF    Squamous Epithelials Urine 13 (H) <=1 /HPF    Transitional Epithelials Urine <1 <=1 /HPF    Narrative    Urine Culture  ordered based on laboratory criteria   CBC with platelets and differential     Status: Abnormal   Result Value Ref Range    WBC Count 12.1 (H) 4.0 - 11.0 10e3/uL    RBC Count 4.40 3.80 - 5.20 10e6/uL    Hemoglobin 11.9 11.7 - 15.7 g/dL    Hematocrit 36.9 35.0 - 47.0 %    MCV 84 78 - 100 fL    MCH 27.0 26.5 - 33.0 pg    MCHC 32.2 31.5 - 36.5 g/dL    RDW 14.1 10.0 - 15.0 %    Platelet Count 412 150 - 450 10e3/uL    % Neutrophils 62 %    % Lymphocytes 29 %    % Monocytes 8 %    % Eosinophils 1 %    % Basophils 0 %    % Immature Granulocytes 0 %    NRBCs per 100 WBC 0 <1 /100    Absolute Neutrophils 7.4 1.6 - 8.3 10e3/uL    Absolute Lymphocytes 3.5 0.8 - 5.3 10e3/uL    Absolute Monocytes 0.9 0.0 - 1.3 10e3/uL    Absolute Eosinophils 0.2 0.0 - 0.7 10e3/uL    Absolute Basophils 0.0 0.0 - 0.2 10e3/uL    Absolute Immature Granulocytes 0.0 <=0.4 10e3/uL    Absolute NRBCs 0.0 10e3/uL   Extra Tube     Status: None    Narrative    The following orders were created for panel order Extra Tube.  Procedure                               Abnormality         Status                     ---------                               -----------         ------                     Extra Green Top (Lithium...[[id number]]                      Final result                 Please view results for these tests on the individual orders.   Extra Green Top (Lithium Heparin) Tube     Status: None   Result Value Ref Range    Hold Specimen JIC    Extra Tube     Status: None    Narrative    The following orders were created for panel order Extra Tube.  Procedure                               Abnormality         Status                     ---------                               -----------         ------                     Extra Red Top Tube[[id number]]                               Final result                 Please view results for these tests on the individual orders.   Extra Red Top Tube     Status: None   Result Value Ref Range    Hold Specimen JIC     EKG 12-lead, tracing only     Status: None (Preliminary result)   Result Value Ref Range    Systolic Blood Pressure  mmHg    Diastolic Blood Pressure  mmHg    Ventricular Rate 82 BPM    Atrial Rate 82 BPM    SD Interval 156 ms    QRS Duration 72 ms     ms    QTc 394 ms    P Axis 40 degrees    R AXIS 23 degrees    T Axis 29 degrees    Interpretation ECG Sinus rhythm  Normal ECG      hCG qual urine POCT     Status: Normal   Result Value Ref Range    HCG Qual Urine Negative Negative    Internal QC Check POCT Valid Valid    POCT Kit Lot Number [id number]     POCT Kit Expiration Date 08/31/2023    CBC with platelets differential     Status: Abnormal    Narrative    The following orders were created for panel order CBC with platelets differential.  Procedure                               Abnormality         Status                     ---------                               -----------         ------                     CBC with platelets and d...[[id number]]  Abnormal            Final result                 Please view results for these tests on the individual orders.     Medications   aspirin (ASA) tablet 325 mg (325 mg Oral Given 4/29/22 2004)   acetaminophen (TYLENOL) tablet 975 mg (975 mg Oral Given 4/29/22 2005)   morphine (PF) injection 4 mg (4 mg Intravenous Given 4/29/22 2005)                 Assessments & Plan (with Medical Decision Making)   Leonel Mena is a 32 year old female presenting with chest pain and shortness of breath. Vital signs are within normal limtis.     Differential includes but is not limited to ACS, pulmonary embolism, pneumothorax, COVID-19, pericarditis, myocarditis. Heart failure considered but less likely given her age, lack of known heart disease, and no evidence of volume overload, pulmonary edema on exam. Considered aortic dissection but less likely based on the nature of her pain, lack of associated symptoms and normal blood pressure.     Plan - labs, ECG and CXR. PRN  analgesics.     The medical record was reviewed and interpreted.  Current labs reviewed and interpreted.  Previous labs reviewed and interpreted.  EKG reviewed and interpreted: as below.    ED Course as of 04/30/22 0015   Fri Apr 29, 2022 1845 Personally reviewed and interpreted ECG.  Normal sinus rhythm with a ventricular rate of 82 bpm.  Normal CT interval, QRS duration and QTC.  No ST segment elevation appreciated.  T wave inversion in lead III.   2014 WBC(!): 12.1   2014 Comprehensive metabolic panel  wn[facility]   2103 D-Dimer Quantitative: 0.46  Negative. No further testing for VTE indicated.    2103 Troponin I High Sensitivity: <3  Low. Based on low pre-test probability and duration of symptoms, no further ACS testing indicated.    2103 Symptomatic; Unknown Influenza A/B & SARS-CoV2 (COVID-19) Virus PCR Multiplex Nasopharyngeal   2104 Chest XR,  PA & LAT  Negative chest.   2126 Pain has improved. Results reviewed with the patient. Discussed discharge instructions, return precautions and follow-up plans. Questions answered. Patient expressed understanding and agreement with the plan.  Patient was discharged home.          I have reviewed the nursing notes. I have reviewed the findings, diagnosis, plan and need for follow up with the patient.     Discharge Medication List as of 4/29/2022  9:37 PM          Final diagnoses:   Chest pain, unspecified type          Alexandra Tan MD  04/30/22 0016

## 2022-04-30 ENCOUNTER — TELEPHONE (OUTPATIENT)
Dept: EMERGENCY MEDICINE | Facility: CLINIC | Age: 32
End: 2022-04-30
Payer: COMMERCIAL

## 2022-04-30 DIAGNOSIS — N39.0 URINARY TRACT INFECTION: ICD-10-CM

## 2022-04-30 RX ORDER — CEFPODOXIME PROXETIL 100 MG/1
100 TABLET, FILM COATED ORAL 2 TIMES DAILY
Qty: 10 TABLET | Refills: 0 | Status: SHIPPED | OUTPATIENT
Start: 2022-04-30 | End: 2022-05-05

## 2022-04-30 NOTE — DISCHARGE INSTRUCTIONS
Follow up with your primary care provider to discuss repeat a lipid panel and determine if you should restart your statin.     Return to the ED if you develop worsening or new symptoms.

## 2022-04-30 NOTE — TELEPHONE ENCOUNTER
"[facility] Emergency Department Lab result notification [Adult-Female]    [place] ED lab result protocol used  Urine culture    Reason for call  Notify of lab results, assess symptoms,  review ED providers recommendations/discharge instructions (if necessary) and advise per ED lab result f/u protocol    Lab Result (including Rx patient on, if applicable)  Preliminary urine culture report on 4/30/22 shows the presence of bacteria(s):  >100,000 CFU/mL Escherichia coli   Emergency Dept/Urgent Care discharge antibiotic: None  Recommendations per [facility] ED Lab result Urine culture protocol.    Information table from Emergency Dept Provider visit on 4/29/22  Symptoms reported at ED visit (Chief complaint, HPI) Chest Pain        Onset last night about 2200 in left chest area, pain and \"feels like there is air in my chest\"; nothing relieves the pain; very tender left upper chest wall     Shortness of Breath       Onset today; denies hx of asthma; does not appear in distress, O2 sats 97%      HPI  Leonel Mena is a 32 year old female with a history of hyperlipidemia no longer on rosuvastain who presents for evaluation of Chest Pain (Onset last night about 2200 in left chest area, pain and \"feels like there is air in my chest\"; nothing relieves the pain; very tender left upper chest wall) and Shortness of Breath (Onset today; denies hx of asthma; does not appear in distress, O2 sats 97%)     Presents for evaluation of constant left-sided chest pain that started last evening between 10 and 11 PM.  She was at rest at the time of onset.  The pain does not radiate.  She rates as 8 out of 10 in severity. Describes as the sensation that she has air in her chest or a heaviness. Feels like she wants to pop it. Pain is somewhat improved with positional changes. Feels like she is suffocating at times while lying flat. Not associated with exertion. No trauma or recent strenuous activity. No fevers, chills, " "cough, nausea, vomiting, abdominal pain, diaphoresis, leg swelling or pain, prolonged  immobilization/travel.  No personal or family history of VTE.  No family history of early onset coronary disease.     History of hyperlipidemia not currently on statin therapy.  Discontinued Depo shots for contraception approximately 1 to 2 months ago.     Significant Medical hx, if applicable (i.e. CKD, diabetes) None   Allergies Allergies   Allergen Reactions     Grass Hives      Weight, if applicable Wt Readings from Last 2 Encounters:   04/29/22 99.2 kg (218 lb 11.2 oz)   09/23/20 93.9 kg (207 lb)      Coumadin/Warfarin [Yes /No] No   Creatinine Level (mg/dl) Creatinine   Date Value Ref Range Status   04/29/2022 0.86 0.52 - 1.04 mg/dL Final   03/09/2020 0.92 0.52 - 1.04 mg/dL Final      Creatinine clearance (ml/min), if applicable Creatinine clearance cannot be calculated (Unknown ideal weight.)   Pregnant (Yes/No/NA) No   Breastfeeding (Yes/No/NA) No   ED providers Impression and Plan (applicable information) Pain has improved. Results reviewed with the patient. Discussed discharge instructions, return precautions and follow-up plans. Questions answered. Patient expressed understanding and agreement with the plan.  Patient was discharged home.   ED diagnosis Chest pain, unspecified type   ED provider Alexandra Tan MD      RN Assessment (Patient s current Symptoms), include time called.  [Insert Left message here if message left]  3:03PM: Patient states she is feeling alittle better today. The chest pain is \"a lot\" better. Has been using hot compresses. No shortness of breath today. Has frequency and urgency with urination. No dysuria. No fever, back pain or abdominal pain. No nausea or vomiting.     RN Recommendations/Instructions per [facility] ED lab result protocol  Patient notified of lab result and treatment recommendations.  Rx for Cefpodoxime (Vantin) 100 MG tablet, 1 tablet (100 mg) by mouth 2 times daily for 5 days " sent to [Pharmacy - [facility] in [place]].   Vantin was chosen over Macrobid to treat initially as the patient had Group B Strep in her urine 6 months ago.    RN reviewed information about UTI's in women    Patient Education on preventing future UTI's.  1. Practice good personal hygiene. Wipe yourself from front to back after using the toilet. This helps keep bacteria from getting into the urethra. Keep the genital area clean and dry.  2. Drink plenty of fluids  3. Empty your bladder. Always empty your bladder when you feel the urge to urinate. And always urinate before going to sleep. Urine that stays in your bladder can lead to infection. Try to urinate before and after sex as well.    RN reviewed information about medication being prescribed based on preliminary findings in the culture and changes may be necessary when culture report finalizes.  If changes are needed, Patient will be contacted.  If no changes are necessary, no call will be placed and Patient has been advised to complete the antibiotic as prescribed today.  Patient verbalizes understanding.    Advised to follow up with the PCP as directed by the ED provider.  The patient is comfortable with the information given and has no further questions.     Please Contact your PCP clinic or return to the Emergency department if your:    Symptoms worsen or other concerning symptom's.    PCP follow-up Questions asked: YES       Juliette Martinez RN  [facility]  Emergency Dept Lab Result RN  Ph# [phone]     Copy of Lab result   Urine Culture  Order: [id number]   Collected 4/29/2022  6:57 PM     Status: Preliminary result     Visible to patient: No (not released)    Specimen Information: Urine, NOS         1 Result Note    Culture >100,000 CFU/mL Escherichia coli Abnormal             Resulting Agency: [facility]           Specimen Collected: 04/29/22  6:57 PM Last Resulted: 04/30/22  2:20 PM

## 2022-05-01 LAB
ATRIAL RATE - MUSE: 82 BPM
BACTERIA UR CULT: ABNORMAL
DIASTOLIC BLOOD PRESSURE - MUSE: NORMAL MMHG
INTERPRETATION ECG - MUSE: NORMAL
P AXIS - MUSE: 40 DEGREES
PR INTERVAL - MUSE: 156 MS
QRS DURATION - MUSE: 72 MS
QT - MUSE: 338 MS
QTC - MUSE: 394 MS
R AXIS - MUSE: 23 DEGREES
SYSTOLIC BLOOD PRESSURE - MUSE: NORMAL MMHG
T AXIS - MUSE: 29 DEGREES
VENTRICULAR RATE- MUSE: 82 BPM

## 2024-03-17 ENCOUNTER — HOSPITAL ENCOUNTER (EMERGENCY)
Facility: CLINIC | Age: 34
Discharge: HOME | End: 2024-03-17
Attending: EMERGENCY MEDICINE | Admitting: EMERGENCY MEDICINE
Payer: COMMERCIAL

## 2024-03-17 VITALS
DIASTOLIC BLOOD PRESSURE: 85 MMHG | RESPIRATION RATE: 16 BRPM | OXYGEN SATURATION: 98 % | HEIGHT: 64 IN | BODY MASS INDEX: 36.48 KG/M2 | TEMPERATURE: 98.7 F | SYSTOLIC BLOOD PRESSURE: 134 MMHG | HEART RATE: 97 BPM | WEIGHT: 213.7 LBS

## 2024-03-17 DIAGNOSIS — N76.0 BACTERIAL VAGINOSIS: ICD-10-CM

## 2024-03-17 DIAGNOSIS — B96.89 BACTERIAL VAGINOSIS: ICD-10-CM

## 2024-03-17 DIAGNOSIS — F32.A DEPRESSION, UNSPECIFIED DEPRESSION TYPE: ICD-10-CM

## 2024-03-17 DIAGNOSIS — N89.8 VAGINAL DISCHARGE: ICD-10-CM

## 2024-03-17 LAB
ALBUMIN UR-MCNC: NEGATIVE MG/DL
APPEARANCE UR: CLEAR
BILIRUB UR QL STRIP: NEGATIVE
COLOR UR AUTO: ABNORMAL
GLUCOSE UR STRIP-MCNC: NEGATIVE MG/DL
HCG UR QL: NEGATIVE
HGB UR QL STRIP: NEGATIVE
KETONES UR STRIP-MCNC: NEGATIVE MG/DL
LEUKOCYTE ESTERASE UR QL STRIP: NEGATIVE
MUCOUS THREADS #/AREA URNS LPF: PRESENT /LPF
NITRATE UR QL: NEGATIVE
PH UR STRIP: 7 [PH] (ref 5–7)
RBC URINE: 6 /HPF
SP GR UR STRIP: 1.02 (ref 1–1.03)
SQUAMOUS EPITHELIAL: 1 /HPF
UROBILINOGEN UR STRIP-MCNC: NORMAL MG/DL
WBC URINE: <1 /HPF

## 2024-03-17 PROCEDURE — 87591 N.GONORRHOEAE DNA AMP PROB: CPT | Performed by: EMERGENCY MEDICINE

## 2024-03-17 PROCEDURE — 99284 EMERGENCY DEPT VISIT MOD MDM: CPT | Performed by: EMERGENCY MEDICINE

## 2024-03-17 PROCEDURE — 81025 URINE PREGNANCY TEST: CPT | Performed by: EMERGENCY MEDICINE

## 2024-03-17 PROCEDURE — 87491 CHLMYD TRACH DNA AMP PROBE: CPT | Performed by: EMERGENCY MEDICINE

## 2024-03-17 PROCEDURE — 81001 URINALYSIS AUTO W/SCOPE: CPT | Performed by: EMERGENCY MEDICINE

## 2024-03-17 RX ORDER — ESCITALOPRAM OXALATE 5 MG/1
5 TABLET ORAL DAILY
Qty: 30 TABLET | Refills: 0 | Status: SHIPPED | OUTPATIENT
Start: 2024-03-17

## 2024-03-17 RX ORDER — METRONIDAZOLE 500 MG/1
500 TABLET ORAL 2 TIMES DAILY
Qty: 14 TABLET | Refills: 0 | Status: SHIPPED | OUTPATIENT
Start: 2024-03-17 | End: 2024-03-24

## 2024-03-17 ASSESSMENT — COLUMBIA-SUICIDE SEVERITY RATING SCALE - C-SSRS
6. HAVE YOU EVER DONE ANYTHING, STARTED TO DO ANYTHING, OR PREPARED TO DO ANYTHING TO END YOUR LIFE?: NO
1. IN THE PAST MONTH, HAVE YOU WISHED YOU WERE DEAD OR WISHED YOU COULD GO TO SLEEP AND NOT WAKE UP?: NO
2. HAVE YOU ACTUALLY HAD ANY THOUGHTS OF KILLING YOURSELF IN THE PAST MONTH?: NO

## 2024-03-17 ASSESSMENT — ACTIVITIES OF DAILY LIVING (ADL)
ADLS_ACUITY_SCORE: 33
ADLS_ACUITY_SCORE: 33

## 2024-03-18 LAB
C TRACH DNA SPEC QL NAA+PROBE: NEGATIVE
N GONORRHOEA DNA SPEC QL NAA+PROBE: NEGATIVE

## 2024-03-18 NOTE — DISCHARGE INSTRUCTIONS
We are starting you on a low-dose of Lexapro for depression.  Please follow-up with your primary care doctor to discuss how you are doing on it and if medication or dosage adjustments need to be made.  Chlamydia gonorrhea test were sent.  They should result in a few days and can be seen on MyChart.  We have prescribed a course of Flagyl for presumed bacterial vaginosis.  Your urine today was normal.  Follow-up with your primary care provider.  Return to the emergency department as needed for any new or worsening symptoms.

## 2024-03-18 NOTE — ED PROVIDER NOTES
"    [facility] EMERGENCY DEPARTMENT ([facility])    3/17/24      ED PROVIDER NOTE    History     Chief Complaint   Patient presents with    Vaginal Discharge     Increased clear vaginal discharge. Painful, intermittently, feels like a punching sensation    Depression     HPI  Leonel Mena is a 34 year old female who presents to the ED with vaginal discharge and depression. PMH of BV. She reports 3 days of vaginal pain. No N/V. She reports pain during intercourse and sporadically throughout the day. She states it feels like a punching sensation. Nothing seems to particularly trigger it. Clear discharge with this, more than usual. No concern for STD. She does report a change in odor, stating her symptoms feel similar to past BV. There is a chance of pregnancy as she is not on birth control and is sexually active. She also reports some new depression lately, endorsing \"low days\" 3-4 days per weeks. She is halving issues with her partner of 18 years as well as stress with her work. She has no history of mental health concerns or medications. She used to have a  and therapist in the past and these did help. She tries to do things she enjoys to keep her mood up. No concern of SI/SIB/HI. She has a PCP she can follow up with.     Past Medical History  Past Medical History:   Diagnosis Date    Hyperlipidemia 02/01/2020    NO ACTIVE PROBLEMS      History reviewed. No pertinent surgical history.  escitalopram (LEXAPRO) 5 MG tablet  medroxyPROGESTERone (DEPO-PROVERA) 150 MG/ML IM injection  metroNIDAZOLE (FLAGYL) 500 MG tablet  rosuvastatin (CRESTOR) 20 MG tablet      Allergies   Allergen Reactions    Grass Hives     Family History  Family History   Problem Relation Age of Onset    Family History Negative Other      Social History   Social History     Tobacco Use    Smoking status: Never    Smokeless tobacco: Never   Substance Use Topics    Alcohol use: Not Currently     Comment: .occ    Drug use: No    " "  Past medical history, past surgical history, medications, allergies, family history, and social history were reviewed with the patient. No additional pertinent items.      A complete review of systems was performed with pertinent positives and negatives noted in the HPI, and all other systems negative.    Physical Exam   BP: 134/85  Pulse: 97  Temp: 98.7  F (37.1  C)  Resp: 16  Height: 162.6 cm (5' 4\")  Weight: 96.9 kg (213 lb 11.2 oz)  SpO2: 98 %  Physical Exam  Vitals and nursing note reviewed.   Constitutional:       General: She is not in acute distress.     Appearance: Normal appearance. She is not diaphoretic.   HENT:      Head: Atraumatic.      Mouth/Throat:      Mouth: Mucous membranes are moist.   Eyes:      General: No scleral icterus.     Conjunctiva/sclera: Conjunctivae normal.   Cardiovascular:      Rate and Rhythm: Normal rate.      Heart sounds: Normal heart sounds.   Pulmonary:      Effort: No respiratory distress.      Breath sounds: Normal breath sounds.   Abdominal:      General: Abdomen is flat.   Musculoskeletal:      Cervical back: Neck supple.   Skin:     General: Skin is warm.      Findings: No rash.   Neurological:      Mental Status: She is alert.           ED Course, Procedures, & Data      Procedures            Results for orders placed or performed during the hospital encounter of 03/17/24   UA with Microscopic reflex to Culture     Status: Abnormal    Specimen: Urine, Midstream   Result Value Ref Range    Color Urine Light Yellow Colorless, Straw, Light Yellow, Yellow    Appearance Urine Clear Clear    Glucose Urine Negative Negative mg/dL    Bilirubin Urine Negative Negative    Ketones Urine Negative Negative mg/dL    Specific Gravity Urine 1.021 1.003 - 1.035    Blood Urine Negative Negative    pH Urine 7.0 5.0 - 7.0    Protein Albumin Urine Negative Negative mg/dL    Urobilinogen Urine Normal Normal, 2.0 mg/dL    Nitrite Urine Negative Negative    Leukocyte Esterase Urine Negative " Negative    Mucus Urine Present (A) None Seen /LPF    RBC Urine 6 (H) <=2 /HPF    WBC Urine <1 <=5 /HPF    Squamous Epithelials Urine 1 <=1 /HPF    Narrative    Urine Culture not indicated   HCG qualitative urine (UPT)     Status: Normal   Result Value Ref Range    hCG Urine Qualitative Negative Negative     Medications - No data to display  Labs Ordered and Resulted from Time of ED Arrival to Time of ED Departure   ROUTINE UA WITH MICROSCOPIC REFLEX TO CULTURE - Abnormal       Result Value    Color Urine Light Yellow      Appearance Urine Clear      Glucose Urine Negative      Bilirubin Urine Negative      Ketones Urine Negative      Specific Gravity Urine 1.021      Blood Urine Negative      pH Urine 7.0      Protein Albumin Urine Negative      Urobilinogen Urine Normal      Nitrite Urine Negative      Leukocyte Esterase Urine Negative      Mucus Urine Present (*)     RBC Urine 6 (*)     WBC Urine <1      Squamous Epithelials Urine 1     HCG QUALITATIVE URINE - Normal    hCG Urine Qualitative Negative     NEISSERIA GONORRHOEAE PCR   CHLAMYDIA TRACHOMATIS PCR     No orders to display        Medical Decision Making  The patient's presentation is strongly suggestive of low complexity (an acute and uncomplicated illness or injury).    The patient's evaluation involved:  review of external note(s) from 3+ sources (Most recent H&P in addition to clinic and ED note)  review of 2 test result(s) ordered prior to this encounter (Most recent BMP and CBC)    The patient's management involved moderate risk (prescription drug management including medications given in the ED).      Assessment & Plan    Patient presents with a few separate issues.  He is worried about some vaginal discharge.  She states it feels like her previous bacterial vaginosis.  She has a low suspicion for gonorrhea and chlamydia as she has been with the same partner for quite some time.  Will empirically treat her for BV.  She would prefer to avoid a  pelvic exam at this time.  She will follow-up with primary care for this.  In addition she has been having some depression issues lately.  It is not all the time however she reports more frequent bad days.  She has a variety of stressors personal professional in her life.  Discussed the options with her.  She is not currently suicidal or homicidal.  Will start her on a low-dose of Lexapro and have her follow-up with primary care for this as well.  In addition I encouraged her to consider recontacting her therapist.  She did have some benefit with a therapist in the past however she reported that she stopped going to them due to the cost.    I have reviewed the nursing notes. I have reviewed the findings, diagnosis, plan and need for follow up with the patient.    New Prescriptions    ESCITALOPRAM (LEXAPRO) 5 MG TABLET    Take 1 tablet (5 mg) by mouth daily    METRONIDAZOLE (FLAGYL) 500 MG TABLET    Take 1 tablet (500 mg) by mouth 2 times daily for 7 days       Final diagnoses:   Depression, unspecified depression type   Vaginal discharge   Bacterial vaginosis   I, Gilmar Reeves, am serving as a trained medical scribe to document services personally performed by Prabhakar Little DO based on the provider's statements to me on March 17, 2024.  This document has been checked and approved by the attending provider.    I, Prabhakar Little DO, was physically present and have reviewed and verified the accuracy of this note documented by Gilmar Reeves medical scribe.      Prabhakar Little DO  [facility] EMERGENCY DEPARTMENT  3/17/2024     Prabhakar Little DO  03/17/24 8911

## 2024-03-18 NOTE — ED TRIAGE NOTES
Increased clear vaginal discharge. Painful, intermittently, feels like a punching sensation.     Pt is also having some depression, but denies any thoughts of hurting self.      Triage Assessment (Adult)       Row Name 03/17/24 1945          Triage Assessment    Airway WDL WDL        Respiratory WDL    Respiratory WDL WDL        Skin Circulation/Temperature WDL    Skin Circulation/Temperature WDL WDL        Cardiac WDL    Cardiac WDL WDL        Peripheral/Neurovascular WDL    Peripheral Neurovascular WDL WDL        Cognitive/Neuro/Behavioral WDL    Cognitive/Neuro/Behavioral WDL WDL

## 2024-04-27 ENCOUNTER — HEALTH MAINTENANCE LETTER (OUTPATIENT)
Age: 34
End: 2024-04-27

## 2024-07-07 ENCOUNTER — HOSPITAL ENCOUNTER (EMERGENCY)
Facility: CLINIC | Age: 34
Discharge: HOME | End: 2024-07-07
Attending: STUDENT IN AN ORGANIZED HEALTH CARE EDUCATION/TRAINING PROGRAM | Admitting: STUDENT IN AN ORGANIZED HEALTH CARE EDUCATION/TRAINING PROGRAM
Payer: COMMERCIAL

## 2024-07-07 VITALS
HEART RATE: 72 BPM | SYSTOLIC BLOOD PRESSURE: 129 MMHG | HEIGHT: 64 IN | OXYGEN SATURATION: 99 % | TEMPERATURE: 98.2 F | BODY MASS INDEX: 35.34 KG/M2 | RESPIRATION RATE: 16 BRPM | WEIGHT: 207 LBS | DIASTOLIC BLOOD PRESSURE: 84 MMHG

## 2024-07-07 DIAGNOSIS — Z20.2 STD EXPOSURE: ICD-10-CM

## 2024-07-07 LAB
AMPHETAMINES UR QL SCN: ABNORMAL
BARBITURATES UR QL SCN: ABNORMAL
BENZODIAZ UR QL SCN: ABNORMAL
BZE UR QL SCN: ABNORMAL
CANNABINOIDS UR QL SCN: ABNORMAL
CLUE CELLS: ABNORMAL
FENTANYL UR QL: ABNORMAL
HCG UR QL: NEGATIVE
HIV 1+2 AB+HIV1 P24 AG SERPL QL IA: NONREACTIVE
HOLD SPECIMEN: NORMAL
OPIATES UR QL SCN: ABNORMAL
PCP QUAL URINE (ROCHE): ABNORMAL
TRICHOMONAS, WET PREP: PRESENT
WBC'S/HIGH POWER FIELD, WET PREP: ABNORMAL
YEAST, WET PREP: ABNORMAL

## 2024-07-07 PROCEDURE — 99284 EMERGENCY DEPT VISIT MOD MDM: CPT | Performed by: STUDENT IN AN ORGANIZED HEALTH CARE EDUCATION/TRAINING PROGRAM

## 2024-07-07 PROCEDURE — 81025 URINE PREGNANCY TEST: CPT | Performed by: EMERGENCY MEDICINE

## 2024-07-07 PROCEDURE — 36415 COLL VENOUS BLD VENIPUNCTURE: CPT | Performed by: EMERGENCY MEDICINE

## 2024-07-07 PROCEDURE — 36415 COLL VENOUS BLD VENIPUNCTURE: CPT | Performed by: STUDENT IN AN ORGANIZED HEALTH CARE EDUCATION/TRAINING PROGRAM

## 2024-07-07 PROCEDURE — 87210 SMEAR WET MOUNT SALINE/INK: CPT | Performed by: STUDENT IN AN ORGANIZED HEALTH CARE EDUCATION/TRAINING PROGRAM

## 2024-07-07 PROCEDURE — 87389 HIV-1 AG W/HIV-1&-2 AB AG IA: CPT | Performed by: EMERGENCY MEDICINE

## 2024-07-07 PROCEDURE — 87591 N.GONORRHOEAE DNA AMP PROB: CPT | Performed by: STUDENT IN AN ORGANIZED HEALTH CARE EDUCATION/TRAINING PROGRAM

## 2024-07-07 PROCEDURE — 80307 DRUG TEST PRSMV CHEM ANLYZR: CPT | Performed by: EMERGENCY MEDICINE

## 2024-07-07 PROCEDURE — 86780 TREPONEMA PALLIDUM: CPT | Performed by: STUDENT IN AN ORGANIZED HEALTH CARE EDUCATION/TRAINING PROGRAM

## 2024-07-07 PROCEDURE — 87491 CHLMYD TRACH DNA AMP PROBE: CPT | Performed by: STUDENT IN AN ORGANIZED HEALTH CARE EDUCATION/TRAINING PROGRAM

## 2024-07-07 RX ORDER — METRONIDAZOLE 500 MG/1
500 TABLET ORAL 2 TIMES DAILY
Qty: 14 TABLET | Refills: 0 | Status: SHIPPED | OUTPATIENT
Start: 2024-07-07

## 2024-07-07 RX ORDER — METRONIDAZOLE 500 MG/1
500 TABLET ORAL 2 TIMES DAILY
Qty: 14 TABLET | Refills: 0 | Status: SHIPPED | OUTPATIENT
Start: 2024-07-07 | End: 2024-07-07

## 2024-07-07 ASSESSMENT — ACTIVITIES OF DAILY LIVING (ADL)
ADLS_ACUITY_SCORE: 35
ADLS_ACUITY_SCORE: 33
ADLS_ACUITY_SCORE: 33

## 2024-07-07 ASSESSMENT — COLUMBIA-SUICIDE SEVERITY RATING SCALE - C-SSRS
1. IN THE PAST MONTH, HAVE YOU WISHED YOU WERE DEAD OR WISHED YOU COULD GO TO SLEEP AND NOT WAKE UP?: NO
6. HAVE YOU EVER DONE ANYTHING, STARTED TO DO ANYTHING, OR PREPARED TO DO ANYTHING TO END YOUR LIFE?: NO
2. HAVE YOU ACTUALLY HAD ANY THOUGHTS OF KILLING YOURSELF IN THE PAST MONTH?: NO

## 2024-07-07 NOTE — ED PROVIDER NOTES
"    [facility] EMERGENCY DEPARTMENT ([facility])  7/07/24  [address] [place]      History     Chief Complaint   Patient presents with    Exposure to STD      tested positive for Trichomonas     HPI  Leonel Mena is a 34 year old female who presents to the Emergency Department for evaluation after exposure to a STD. Patient states her  told her he tested positive for Trichomoniasis. She has been asymptomatic and states she would like to be treated prophylactically.       Past Medical History  Past Medical History:   Diagnosis Date    Hyperlipidemia 02/01/2020    NO ACTIVE PROBLEMS      History reviewed. No pertinent surgical history.  escitalopram (LEXAPRO) 5 MG tablet  medroxyPROGESTERone (DEPO-PROVERA) 150 MG/ML IM injection  rosuvastatin (CRESTOR) 20 MG tablet      Allergies   Allergen Reactions    Grass Hives     Family History  Family History   Problem Relation Age of Onset    Family History Negative Other      Social History   Social History     Tobacco Use    Smoking status: Never    Smokeless tobacco: Never   Substance Use Topics    Alcohol use: Not Currently     Comment: .occ    Drug use: No      Past medical history, past surgical history, medications, allergies, family history, and social history were reviewed with the patient. No additional pertinent items.     A medically appropriate review of systems was performed with pertinent positives and negatives noted in the HPI, and all other systems negative.    Physical Exam   BP: 118/78  Pulse: 82  Temp: 97.9  F (36.6  C)  Resp: 16  Height: 162.6 cm (5' 4\")  Weight: 93.9 kg (207 lb)  SpO2: 99 %  Physical Exam  Constitutional:       General: She is not in acute distress.     Appearance: Normal appearance. She is not diaphoretic.   HENT:      Head: Atraumatic.      Mouth/Throat:      Mouth: Mucous membranes are moist.   Eyes:      General: No scleral icterus.     Conjunctiva/sclera: Conjunctivae normal.   Cardiovascular:      Rate and Rhythm: " Normal rate.      Heart sounds: Normal heart sounds.   Pulmonary:      Effort: No respiratory distress.      Breath sounds: Normal breath sounds.   Abdominal:      General: Abdomen is flat.   Musculoskeletal:      Cervical back: Neck supple.   Skin:     General: Skin is warm.      Findings: No rash.   Neurological:      Mental Status: She is alert.           ED Course, Procedures, & Data      Procedures                No results found for any visits on 07/07/24.  Medications - No data to display  Labs Ordered and Resulted from Time of ED Arrival to Time of ED Departure - No data to display  No orders to display          Critical care was not performed.     Medical Decision Making  The patient's presentation was of moderate complexity (an undiagnosed new problem with uncertain prognosis).    The patient's evaluation involved:  ordering and/or review of 3+ test(s) in this encounter (see separate area of note for details)    The patient's management necessitated moderate risk (conversations regarding STI testing and treatment).    Assessment & Plan    Here in the emergency room patient requesting STI testing, but asymptomatic at this point  Although I offered a pelvic exam, patient declined and elected to self swab  At this point as patient has no symptoms, no discharge or burning, low concern for PID/TOA  Labs show positive trichomonas, rest of labs pending at time of discharge, so patient discharged home with prescription for Flagyl  Discharge home with strict return precaution, to the emergency room for new or worsening symptoms, instructed to follow-up with her primary care doctor in the next 1 to 2 days as well as check MyChart for her results    I have reviewed the nursing notes. I have reviewed the findings, diagnosis, plan and need for follow up with the patient.    New Prescriptions    No medications on file       Final diagnoses:   None   I, CAPRICE TAN, am serving as a trained medical scribe to document  services personally performed by Cory Fonseca MD, based on the provider's statements to me.     I, Cory Fonseca MD, was physically present and have reviewed and verified the accuracy of this note documented by CAPRICE TAN.    Cory Fonseca MD  [facility] EMERGENCY DEPARTMENT  7/7/2024        Cory Fonseca MD  07/07/24 [id number]

## 2024-07-07 NOTE — DISCHARGE INSTRUCTIONS
Please check your MyChart for additional results  You are being discharged on antibiotics that I would like you to take as prescribed  Please take your antibiotics with food and a probiotic  If you have any new or worsening symptoms please return immediately to the emergency room

## 2024-07-07 NOTE — ED TRIAGE NOTES
Patient asymptomatic at the moment     Triage Assessment (Adult)       Row Name 07/07/24 0655          Triage Assessment    Airway WDL WDL        Respiratory WDL    Respiratory WDL WDL        Skin Circulation/Temperature WDL    Skin Circulation/Temperature WDL WDL        Cardiac WDL    Cardiac WDL WDL        Peripheral/Neurovascular WDL    Peripheral Neurovascular WDL WDL        Cognitive/Neuro/Behavioral WDL    Cognitive/Neuro/Behavioral WDL WDL

## 2024-07-08 LAB
C TRACH DNA SPEC QL NAA+PROBE: NEGATIVE
N GONORRHOEA DNA SPEC QL NAA+PROBE: NEGATIVE
T PALLIDUM AB SER QL: NONREACTIVE